# Patient Record
Sex: FEMALE | Race: WHITE | NOT HISPANIC OR LATINO
[De-identification: names, ages, dates, MRNs, and addresses within clinical notes are randomized per-mention and may not be internally consistent; named-entity substitution may affect disease eponyms.]

---

## 2017-01-09 ENCOUNTER — APPOINTMENT (OUTPATIENT)
Dept: PULMONOLOGY | Facility: CLINIC | Age: 47
End: 2017-01-09

## 2017-01-09 VITALS
RESPIRATION RATE: 18 BRPM | DIASTOLIC BLOOD PRESSURE: 70 MMHG | HEART RATE: 68 BPM | BODY MASS INDEX: 48.49 KG/M2 | HEIGHT: 64 IN | WEIGHT: 284 LBS | SYSTOLIC BLOOD PRESSURE: 120 MMHG | OXYGEN SATURATION: 99 %

## 2017-01-13 RX ORDER — ROSUVASTATIN CALCIUM 10 MG/1
10 TABLET, FILM COATED ORAL
Refills: 0 | Status: ACTIVE | COMMUNITY

## 2017-04-10 ENCOUNTER — APPOINTMENT (OUTPATIENT)
Dept: PULMONOLOGY | Facility: CLINIC | Age: 47
End: 2017-04-10

## 2017-04-21 ENCOUNTER — RECORD ABSTRACTING (OUTPATIENT)
Age: 47
End: 2017-04-21

## 2017-04-24 ENCOUNTER — APPOINTMENT (OUTPATIENT)
Dept: PULMONOLOGY | Facility: CLINIC | Age: 47
End: 2017-04-24

## 2017-04-24 VITALS
DIASTOLIC BLOOD PRESSURE: 60 MMHG | BODY MASS INDEX: 49 KG/M2 | HEIGHT: 64 IN | HEART RATE: 68 BPM | RESPIRATION RATE: 16 BRPM | SYSTOLIC BLOOD PRESSURE: 116 MMHG | OXYGEN SATURATION: 99 % | WEIGHT: 287 LBS

## 2017-07-03 ENCOUNTER — APPOINTMENT (OUTPATIENT)
Dept: PULMONOLOGY | Facility: CLINIC | Age: 47
End: 2017-07-03

## 2017-07-03 VITALS
WEIGHT: 275 LBS | DIASTOLIC BLOOD PRESSURE: 74 MMHG | RESPIRATION RATE: 16 BRPM | SYSTOLIC BLOOD PRESSURE: 98 MMHG | HEIGHT: 64 IN | HEART RATE: 78 BPM | BODY MASS INDEX: 46.95 KG/M2 | OXYGEN SATURATION: 99 %

## 2017-07-05 RX ORDER — AMOXICILLIN AND CLAVULANATE POTASSIUM 875; 125 MG/1; MG/1
875-125 TABLET, COATED ORAL
Qty: 14 | Refills: 0 | Status: COMPLETED | COMMUNITY
Start: 2017-03-03

## 2017-10-02 ENCOUNTER — APPOINTMENT (OUTPATIENT)
Dept: PULMONOLOGY | Facility: CLINIC | Age: 47
End: 2017-10-02

## 2018-02-05 ENCOUNTER — APPOINTMENT (OUTPATIENT)
Dept: PULMONOLOGY | Facility: CLINIC | Age: 48
End: 2018-02-05
Payer: MEDICARE

## 2018-02-05 VITALS
BODY MASS INDEX: 47.46 KG/M2 | HEIGHT: 64 IN | SYSTOLIC BLOOD PRESSURE: 130 MMHG | WEIGHT: 278 LBS | DIASTOLIC BLOOD PRESSURE: 78 MMHG | HEART RATE: 80 BPM | RESPIRATION RATE: 16 BRPM | OXYGEN SATURATION: 96 %

## 2018-02-05 PROCEDURE — 99214 OFFICE O/P EST MOD 30 MIN: CPT

## 2018-09-24 ENCOUNTER — APPOINTMENT (OUTPATIENT)
Dept: PULMONOLOGY | Facility: CLINIC | Age: 48
End: 2018-09-24
Payer: MEDICARE

## 2018-09-24 VITALS
DIASTOLIC BLOOD PRESSURE: 77 MMHG | RESPIRATION RATE: 16 BRPM | BODY MASS INDEX: 47.46 KG/M2 | OXYGEN SATURATION: 97 % | SYSTOLIC BLOOD PRESSURE: 117 MMHG | HEIGHT: 64 IN | WEIGHT: 278 LBS | HEART RATE: 87 BPM

## 2018-09-24 PROCEDURE — 99214 OFFICE O/P EST MOD 30 MIN: CPT

## 2018-09-24 RX ORDER — AZITHROMYCIN 250 MG/1
250 TABLET, FILM COATED ORAL
Qty: 6 | Refills: 0 | Status: COMPLETED | COMMUNITY
Start: 2018-05-03

## 2018-09-24 RX ORDER — NITROFURANTOIN (MONOHYDRATE/MACROCRYSTALS) 25; 75 MG/1; MG/1
100 CAPSULE ORAL
Qty: 10 | Refills: 0 | Status: COMPLETED | COMMUNITY
Start: 2018-07-26

## 2018-09-24 RX ORDER — CIPROFLOXACIN HYDROCHLORIDE 500 MG/1
500 TABLET, FILM COATED ORAL
Qty: 10 | Refills: 0 | Status: COMPLETED | COMMUNITY
Start: 2018-04-20

## 2018-09-24 RX ORDER — ALPRAZOLAM 0.5 MG/1
0.5 TABLET ORAL
Qty: 60 | Refills: 0 | Status: ACTIVE | COMMUNITY
Start: 2018-05-10

## 2018-09-24 RX ORDER — ESCITALOPRAM OXALATE 20 MG/1
20 TABLET ORAL
Qty: 90 | Refills: 0 | Status: ACTIVE | COMMUNITY
Start: 2018-05-03

## 2018-09-24 RX ORDER — MONTELUKAST 10 MG/1
10 TABLET, FILM COATED ORAL
Qty: 90 | Refills: 0 | Status: COMPLETED | COMMUNITY
Start: 2018-02-05 | End: 2018-09-24

## 2019-03-18 ENCOUNTER — APPOINTMENT (OUTPATIENT)
Dept: PULMONOLOGY | Facility: CLINIC | Age: 49
End: 2019-03-18
Payer: MEDICARE

## 2019-03-18 VITALS — TEMPERATURE: 102.2 F

## 2019-03-18 VITALS
WEIGHT: 284 LBS | DIASTOLIC BLOOD PRESSURE: 79 MMHG | RESPIRATION RATE: 24 BRPM | HEIGHT: 64 IN | BODY MASS INDEX: 48.49 KG/M2 | HEART RATE: 107 BPM | SYSTOLIC BLOOD PRESSURE: 137 MMHG | OXYGEN SATURATION: 92 %

## 2019-03-18 PROCEDURE — 99214 OFFICE O/P EST MOD 30 MIN: CPT

## 2019-03-18 RX ORDER — ALBUTEROL SULFATE 90 UG/1
108 (90 BASE) AEROSOL, METERED RESPIRATORY (INHALATION)
Qty: 1 | Refills: 1 | Status: ACTIVE | COMMUNITY
Start: 2019-03-18 | End: 1900-01-01

## 2019-03-20 NOTE — PHYSICAL EXAM
[Well Groomed] : well groomed [No Deformities] : no deformities [General Appearance - In No Acute Distress] : no acute distress [Normal Conjunctiva] : the conjunctiva exhibited no abnormalities [Neck Appearance] : the appearance of the neck was normal [Eyelids - No Xanthelasma] : the eyelids demonstrated no xanthelasmas [Neck Cervical Mass (___cm)] : no neck mass was observed [Jugular Venous Distention Increased] : there was no jugular-venous distention [Thyroid Nodule] : there were no palpable thyroid nodules [Thyroid Diffuse Enlargement] : the thyroid was not enlarged [FreeTextEntry1] : Enlarged tonsils bilaterally without exudates [Heart Rate And Rhythm] : heart rate and rhythm were normal [Heart Sounds] : normal S1 and S2 [Murmurs] : no murmurs present [Exaggerated Use Of Accessory Muscles For Inspiration] : no accessory muscle use [Respiration, Rhythm And Depth] : normal respiratory rhythm and effort [Abdomen Soft] : soft [Scattered Wheezes] : scattered wheezing [Abdomen Tenderness] : non-tender [Abdomen Mass (___ Cm)] : no abdominal mass palpated [Cyanosis, Localized] : no localized cyanosis [Nail Clubbing] : no clubbing of the fingernails [Petechial Hemorrhages (___cm)] : no petechial hemorrhages [Skin Color & Pigmentation] : normal skin color and pigmentation [] : no rash [No Venous Stasis] : no venous stasis [Skin Lesions] : no skin lesions [No Skin Ulcers] : no skin ulcer [No Xanthoma] : no  xanthoma was observed [No Focal Deficits] : no focal deficits [Oriented To Time, Place, And Person] : oriented to person, place, and time [Affect] : the affect was normal [Impaired Insight] : insight and judgment were intact

## 2019-03-20 NOTE — REVIEW OF SYSTEMS
[Fatigue] : no fatigue [Cough] : cough [Sputum] : sputum  [Dyspnea] : dyspnea [Chest Tightness] : no chest tightness [Wheezing] : no wheezing [As Noted in HPI] : as noted in HPI [Negative] : Sleep Disorder

## 2019-03-20 NOTE — HISTORY OF PRESENT ILLNESS
[FreeTextEntry1] : 49 yo female with hx of OAD presents for follow up. The patient complains of PRN cough with POLLOCK. She uses singualir daily with increase in albuterol MDI use. She complains of one day history of fever associated with wheezing. She denies chest pain, night sweats or hemoptysis.\par The patient has stopped use of CPAP because of neck pain.

## 2019-03-20 NOTE — DISCUSSION/SUMMARY
[FreeTextEntry1] : 47 yo female with mild exacerbation of OAD. Medrol pack was prescribed. She is to continue singulair daily with PRN albuterol MDI. Treatment adjustment will depend on symptomatic needs. I reviewed the images of her recent chest CT on line and discussed the findings with the patient. The ground glass changes are non specific and likely inflammatory in nature. A repeat study will be performed in the future.Need for CPAP use was stressed but the patient complains of significant discomfort with CPAP.Diet and weight loss was stressed. She is to follow up with her PMD as before.

## 2019-06-17 ENCOUNTER — APPOINTMENT (OUTPATIENT)
Dept: PULMONOLOGY | Facility: CLINIC | Age: 49
End: 2019-06-17
Payer: MEDICARE

## 2019-06-17 VITALS
HEIGHT: 62.5 IN | HEART RATE: 83 BPM | DIASTOLIC BLOOD PRESSURE: 79 MMHG | SYSTOLIC BLOOD PRESSURE: 127 MMHG | RESPIRATION RATE: 20 BRPM | OXYGEN SATURATION: 96 % | WEIGHT: 274 LBS | BODY MASS INDEX: 49.16 KG/M2

## 2019-06-17 PROCEDURE — 94727 GAS DIL/WSHOT DETER LNG VOL: CPT

## 2019-06-17 PROCEDURE — 94060 EVALUATION OF WHEEZING: CPT

## 2019-06-17 PROCEDURE — 94729 DIFFUSING CAPACITY: CPT

## 2019-06-17 PROCEDURE — 99214 OFFICE O/P EST MOD 30 MIN: CPT | Mod: 25

## 2019-06-17 NOTE — PHYSICAL EXAM
[Well Groomed] : well groomed [No Deformities] : no deformities [General Appearance - In No Acute Distress] : no acute distress [Normal Conjunctiva] : the conjunctiva exhibited no abnormalities [Eyelids - No Xanthelasma] : the eyelids demonstrated no xanthelasmas [Neck Appearance] : the appearance of the neck was normal [Neck Cervical Mass (___cm)] : no neck mass was observed [Jugular Venous Distention Increased] : there was no jugular-venous distention [Thyroid Diffuse Enlargement] : the thyroid was not enlarged [Thyroid Nodule] : there were no palpable thyroid nodules [FreeTextEntry1] : Enlarged tonsils bilaterally without exudates [Heart Rate And Rhythm] : heart rate and rhythm were normal [Heart Sounds] : normal S1 and S2 [Murmurs] : no murmurs present [Respiration, Rhythm And Depth] : normal respiratory rhythm and effort [Exaggerated Use Of Accessory Muscles For Inspiration] : no accessory muscle use [Auscultation Breath Sounds / Voice Sounds] : lungs were clear to auscultation bilaterally [Abdomen Tenderness] : non-tender [Abdomen Soft] : soft [Abdomen Mass (___ Cm)] : no abdominal mass palpated [Nail Clubbing] : no clubbing of the fingernails [Cyanosis, Localized] : no localized cyanosis [Petechial Hemorrhages (___cm)] : no petechial hemorrhages [Skin Color & Pigmentation] : normal skin color and pigmentation [] : no rash [Skin Lesions] : no skin lesions [No Venous Stasis] : no venous stasis [No Skin Ulcers] : no skin ulcer [No Xanthoma] : no  xanthoma was observed [No Focal Deficits] : no focal deficits [Oriented To Time, Place, And Person] : oriented to person, place, and time [Impaired Insight] : insight and judgment were intact [Affect] : the affect was normal

## 2019-06-17 NOTE — HISTORY OF PRESENT ILLNESS
[FreeTextEntry1] : 49 yo female with hx of RODGER and OAD presents for follow up. The patient is compliant with CPAP use but is having problem with ears and throat after use. She was recently evaluated by ENT, without significant abnormalities noted. Her machine is "giving her problems".The patient complains of PRN POLLOCK with cough. She uses singulair daily with PRN albuterol MDI.

## 2019-06-17 NOTE — DISCUSSION/SUMMARY
[FreeTextEntry1] : 47 yo female with mild OAD at baseline. She is to continue montelukast daily with PRN albuterol MDI. Treatment adjustment will depend on symptomatic needs. I again discussed the results of her recent chest CT results. Repeat study will be performed in the future. Given the problems she is having with her CPAP machine, a new machine will be ordered.\par She is to follow up with her PMD as before.

## 2020-01-06 ENCOUNTER — APPOINTMENT (OUTPATIENT)
Dept: PULMONOLOGY | Facility: CLINIC | Age: 50
End: 2020-01-06
Payer: MEDICARE

## 2020-01-06 VITALS
HEIGHT: 63 IN | DIASTOLIC BLOOD PRESSURE: 70 MMHG | RESPIRATION RATE: 16 BRPM | OXYGEN SATURATION: 97 % | WEIGHT: 288 LBS | SYSTOLIC BLOOD PRESSURE: 100 MMHG | BODY MASS INDEX: 51.03 KG/M2 | HEART RATE: 74 BPM

## 2020-01-06 DIAGNOSIS — R91.1 SOLITARY PULMONARY NODULE: ICD-10-CM

## 2020-01-06 PROCEDURE — 94727 GAS DIL/WSHOT DETER LNG VOL: CPT

## 2020-01-06 PROCEDURE — 94060 EVALUATION OF WHEEZING: CPT

## 2020-01-06 PROCEDURE — 94729 DIFFUSING CAPACITY: CPT

## 2020-01-06 PROCEDURE — 99214 OFFICE O/P EST MOD 30 MIN: CPT | Mod: 25

## 2020-01-08 PROBLEM — R91.1 PULMONARY NODULE: Status: ACTIVE | Noted: 2018-09-24

## 2020-01-08 NOTE — REVIEW OF SYSTEMS
[Myalgias] : myalgias [Arthralgias] : arthralgias [As Noted in HPI] : as noted in HPI [Negative] : Sleep Disorder [Fatigue] : no fatigue [Cough] : no cough [Sputum] : not coughing up ~M sputum [Dyspnea] : no dyspnea [Chest Tightness] : no chest tightness [Wheezing] : no wheezing

## 2020-01-08 NOTE — DISCUSSION/SUMMARY
[FreeTextEntry1] : 50 yo female with stable mild OAD. She is to use montelukast daily with PRN albuterol MDI. Treatment adjustment will depend on symptomatic needs. PFT will be repeated in the future, to follow up diffusion and lung volumes given rheum problem and treatment. A repeat chest CT will be performed in the near future. Compliance with CPAP use was stressed. She is to follow up with her PMD as before.

## 2020-01-08 NOTE — PHYSICAL EXAM
[Well Groomed] : well groomed [No Deformities] : no deformities [General Appearance - In No Acute Distress] : no acute distress [Normal Conjunctiva] : the conjunctiva exhibited no abnormalities [Eyelids - No Xanthelasma] : the eyelids demonstrated no xanthelasmas [Neck Appearance] : the appearance of the neck was normal [Neck Cervical Mass (___cm)] : no neck mass was observed [Jugular Venous Distention Increased] : there was no jugular-venous distention [Thyroid Diffuse Enlargement] : the thyroid was not enlarged [Thyroid Nodule] : there were no palpable thyroid nodules [Heart Rate And Rhythm] : heart rate and rhythm were normal [Heart Sounds] : normal S1 and S2 [Murmurs] : no murmurs present [Respiration, Rhythm And Depth] : normal respiratory rhythm and effort [Exaggerated Use Of Accessory Muscles For Inspiration] : no accessory muscle use [Auscultation Breath Sounds / Voice Sounds] : lungs were clear to auscultation bilaterally [Abdomen Soft] : soft [Abdomen Tenderness] : non-tender [Abdomen Mass (___ Cm)] : no abdominal mass palpated [Nail Clubbing] : no clubbing of the fingernails [Cyanosis, Localized] : no localized cyanosis [Petechial Hemorrhages (___cm)] : no petechial hemorrhages [Skin Color & Pigmentation] : normal skin color and pigmentation [] : no rash [No Venous Stasis] : no venous stasis [Skin Lesions] : no skin lesions [No Skin Ulcers] : no skin ulcer [No Xanthoma] : no  xanthoma was observed [No Focal Deficits] : no focal deficits [Oriented To Time, Place, And Person] : oriented to person, place, and time [Impaired Insight] : insight and judgment were intact [Affect] : the affect was normal [FreeTextEntry1] : Enlarged tonsils bilaterally without exudates

## 2020-01-08 NOTE — CONSULT LETTER
[Dear  ___] : Dear  [unfilled], [Courtesy Letter:] : I had the pleasure of seeing your patient, [unfilled], in my office today. [Please see my note below.] : Please see my note below. [Consult Closing:] : Thank you very much for allowing me to participate in the care of this patient.  If you have any questions, please do not hesitate to contact me. [Sincerely,] : Sincerely, [DrBryant  ___] : Dr. ZAVALA

## 2020-01-08 NOTE — HISTORY OF PRESENT ILLNESS
[FreeTextEntry1] : 48 yo female with hx of OAD and RODGER presents for follow up. The patient feels "good", compliant with CPAP use with her new machine. She uses montelukast daily with PRN albuterol MDI. Since her last visit, she was started on plaquenil for RA.

## 2020-06-29 ENCOUNTER — APPOINTMENT (OUTPATIENT)
Dept: PULMONOLOGY | Facility: CLINIC | Age: 50
End: 2020-06-29
Payer: MEDICARE

## 2020-06-29 VITALS
RESPIRATION RATE: 16 BRPM | DIASTOLIC BLOOD PRESSURE: 70 MMHG | HEART RATE: 86 BPM | WEIGHT: 293 LBS | SYSTOLIC BLOOD PRESSURE: 100 MMHG | HEIGHT: 63 IN | BODY MASS INDEX: 51.91 KG/M2 | OXYGEN SATURATION: 97 %

## 2020-06-29 PROCEDURE — 99214 OFFICE O/P EST MOD 30 MIN: CPT

## 2020-07-03 NOTE — DISCUSSION/SUMMARY
[FreeTextEntry1] : 51 yo female with stable pulmonary exam. She is to continue montelukast daily with PRN albuterol MDI. Treatment adjustment will depend on symptomatic needs.PFT will be repeated in the future given her history of RA and treatment.Continued compliance with CPAP use stressed. I also discussed the latest chest CT findings after reviewing the images on line. At present there is no need for follow up study . She is to follow up with her PMD as before.

## 2020-07-03 NOTE — PHYSICAL EXAM
[No Acute Distress] : no acute distress [Normal Oropharynx] : normal oropharynx [Normal Appearance] : normal appearance [No Neck Mass] : no neck mass [Normal Rate/Rhythm] : normal rate/rhythm [Normal S1, S2] : normal s1, s2 [No Murmurs] : no murmurs [No Resp Distress] : no resp distress [Clear to Auscultation Bilaterally] : clear to auscultation bilaterally [Benign] : benign [No Abnormalities] : no abnormalities [Normal Gait] : normal gait [No Clubbing] : no clubbing [No Cyanosis] : no cyanosis [No Edema] : no edema [FROM] : FROM [Normal Color/ Pigmentation] : normal color/ pigmentation [No Focal Deficits] : no focal deficits [Oriented x3] : oriented x3 [Normal Affect] : normal affect

## 2020-07-03 NOTE — HISTORY OF PRESENT ILLNESS
[TextBox_4] : 48 yo female with hx of OAD and RODGER presents for follow up. The patient complains of PRN productive cough without fever, chest pain or hemoptysis. She uses montelukast daily with PRN albuterol. She is compliant with CPAP use.

## 2020-07-03 NOTE — CONSULT LETTER
[Dear  ___] : Dear  [unfilled], [Courtesy Letter:] : I had the pleasure of seeing your patient, [unfilled], in my office today. [Please see my note below.] : Please see my note below. [Sincerely,] : Sincerely, [Consult Closing:] : Thank you very much for allowing me to participate in the care of this patient.  If you have any questions, please do not hesitate to contact me. [DrBryant  ___] : Dr. ZAVALA

## 2020-12-28 ENCOUNTER — APPOINTMENT (OUTPATIENT)
Dept: PULMONOLOGY | Facility: CLINIC | Age: 50
End: 2020-12-28
Payer: MEDICARE

## 2020-12-28 VITALS
RESPIRATION RATE: 16 BRPM | SYSTOLIC BLOOD PRESSURE: 120 MMHG | DIASTOLIC BLOOD PRESSURE: 80 MMHG | OXYGEN SATURATION: 97 % | TEMPERATURE: 96.2 F | HEART RATE: 95 BPM

## 2020-12-28 PROCEDURE — 94727 GAS DIL/WSHOT DETER LNG VOL: CPT

## 2020-12-28 PROCEDURE — 94729 DIFFUSING CAPACITY: CPT

## 2020-12-28 PROCEDURE — 99214 OFFICE O/P EST MOD 30 MIN: CPT | Mod: 25

## 2020-12-28 PROCEDURE — 94060 EVALUATION OF WHEEZING: CPT

## 2020-12-28 RX ORDER — FLUOROMETHOLONE ACETATE 1 MG/ML
0.1 SUSPENSION/ DROPS OPHTHALMIC
Qty: 5 | Refills: 0 | Status: COMPLETED | COMMUNITY
Start: 2020-07-06

## 2020-12-28 RX ORDER — METHYLPREDNISOLONE 4 MG/1
4 TABLET ORAL
Qty: 1 | Refills: 3 | Status: COMPLETED | COMMUNITY
Start: 2018-02-05 | End: 2020-12-28

## 2020-12-28 RX ORDER — ALBUTEROL SULFATE 90 UG/1
108 (90 BASE) AEROSOL, METERED RESPIRATORY (INHALATION)
Qty: 1 | Refills: 3 | Status: COMPLETED | COMMUNITY
Start: 2017-04-24 | End: 2020-12-28

## 2020-12-28 RX ORDER — BECLOMETHASONE DIPROPIONATE HFA 80 UG/1
80 AEROSOL, METERED RESPIRATORY (INHALATION)
Qty: 1 | Refills: 3 | Status: COMPLETED | COMMUNITY
Start: 2019-03-18 | End: 2020-12-28

## 2020-12-28 RX ORDER — METHYLPREDNISOLONE 4 MG/1
4 TABLET ORAL
Qty: 21 | Refills: 2 | Status: COMPLETED | COMMUNITY
Start: 2019-03-18 | End: 2020-12-28

## 2020-12-28 RX ORDER — MELOXICAM 15 MG/1
15 TABLET ORAL
Qty: 30 | Refills: 0 | Status: COMPLETED | COMMUNITY
Start: 2020-07-30

## 2020-12-28 RX ORDER — HYDROXYCHLOROQUINE SULFATE 200 MG/1
200 TABLET, FILM COATED ORAL
Qty: 60 | Refills: 0 | Status: COMPLETED | COMMUNITY
Start: 2020-07-30

## 2020-12-28 RX ORDER — METHYLPREDNISOLONE 4 MG/1
4 TABLET ORAL
Qty: 1 | Refills: 0 | Status: COMPLETED | COMMUNITY
Start: 2020-08-11 | End: 2020-12-28

## 2020-12-28 RX ORDER — MONTELUKAST 10 MG/1
10 TABLET, FILM COATED ORAL
Qty: 3 | Refills: 3 | Status: COMPLETED | COMMUNITY
Start: 2019-03-18 | End: 2020-12-28

## 2020-12-28 RX ORDER — AZITHROMYCIN 250 MG/1
250 TABLET, FILM COATED ORAL
Qty: 1 | Refills: 0 | Status: COMPLETED | COMMUNITY
Start: 2020-08-11 | End: 2020-12-28

## 2021-01-09 NOTE — HISTORY OF PRESENT ILLNESS
[Never] : never [TextBox_4] : 51 yo female with hx of RODGER and OAD presents for follow up. The patient complains of POLLOCK since summer with PRN nocturnal wheezing. She denies cough, chest pain or hemoptysis. She was covid 19 positive in August, treated at home with zithromax and medrol.She uses montelukast daily with PRN albuterol MDI. She is on GERD treatment and is compliant with CPAP use.  [TextBox_29] : Denies snoring, daytime somnolence, apneic episodes, AM headaches

## 2021-01-09 NOTE — REVIEW OF SYSTEMS
[Recent Wt Gain (___ Lbs)] : ~T recent [unfilled] lb weight gain [Cough] : no cough [Hemoptysis] : no hemoptysis [Sputum] : no sputum [Wheezing] : wheezing [SOB on Exertion] : sob on exertion [GERD] : gerd [Negative] : Endocrine

## 2021-01-09 NOTE — PHYSICAL EXAM
[No Acute Distress] : no acute distress [Normal Oropharynx] : normal oropharynx [Normal Appearance] : normal appearance [No Neck Mass] : no neck mass [Normal Rate/Rhythm] : normal rate/rhythm [Normal S1, S2] : normal s1, s2 [No Murmurs] : no murmurs [No Resp Distress] : no resp distress [Clear to Auscultation Bilaterally] : clear to auscultation bilaterally [No Abnormalities] : no abnormalities [Benign] : benign [Normal Gait] : normal gait [No Clubbing] : no clubbing [No Cyanosis] : no cyanosis [No Edema] : no edema [FROM] : FROM [Normal Color/ Pigmentation] : normal color/ pigmentation [No Focal Deficits] : no focal deficits [Oriented x3] : oriented x3 [Normal Affect] : normal affect [TextBox_2] : Increased BMI

## 2021-01-09 NOTE — DISCUSSION/SUMMARY
[FreeTextEntry1] : 51 yo female with OAD related complaints. She is to restart flovent use with continued use of montelukast and albuterol MDI. PFT will be repeated in the future. Diet, weight loss and GERD treatment discussed. Follow up with rheumatology and her PMD as before.

## 2021-03-29 ENCOUNTER — APPOINTMENT (OUTPATIENT)
Dept: PULMONOLOGY | Facility: CLINIC | Age: 51
End: 2021-03-29
Payer: MEDICARE

## 2021-03-29 VITALS
RESPIRATION RATE: 20 BRPM | WEIGHT: 293 LBS | HEIGHT: 63 IN | TEMPERATURE: 98 F | BODY MASS INDEX: 51.91 KG/M2 | DIASTOLIC BLOOD PRESSURE: 77 MMHG | SYSTOLIC BLOOD PRESSURE: 128 MMHG | HEART RATE: 85 BPM | OXYGEN SATURATION: 95 %

## 2021-03-29 DIAGNOSIS — J45.991 COUGH VARIANT ASTHMA: ICD-10-CM

## 2021-03-29 PROCEDURE — 99213 OFFICE O/P EST LOW 20 MIN: CPT

## 2021-03-29 RX ORDER — LEVOTHYROXINE SODIUM 175 UG/1
175 TABLET ORAL
Refills: 0 | Status: ACTIVE | COMMUNITY

## 2021-03-29 RX ORDER — FLUTICASONE PROPIONATE 110 UG/1
110 AEROSOL, METERED RESPIRATORY (INHALATION)
Qty: 1 | Refills: 3 | Status: ACTIVE | COMMUNITY
Start: 2019-03-18 | End: 1900-01-01

## 2021-04-04 NOTE — HISTORY OF PRESENT ILLNESS
[Never] : never [TextBox_4] : 49 yo female with hx of RODGER and OAD presents for follow up. The patient complains of PRN dry cough with POLLOCK. She denies chest pain or hemoptysis. She uses montelukast daily with PRN albuterol MDI. She has not used ICS, recommended last visit. Her weight continues to increase with bilateral knee pain.She is compliant with CPAP use. [TextBox_29] : Denies snoring, daytime somnolence, apneic episodes, AM headaches

## 2021-04-04 NOTE — REVIEW OF SYSTEMS
[Recent Wt Gain (___ Lbs)] : ~T recent [unfilled] lb weight gain [Cough] : cough [Hemoptysis] : no hemoptysis [Sputum] : no sputum [Wheezing] : no wheezing [SOB on Exertion] : sob on exertion [GERD] : gerd [Arthralgias] : arthralgias [Negative] : Psychiatric

## 2021-04-04 NOTE — DISCUSSION/SUMMARY
[FreeTextEntry1] : 51 yo female with OAD related complaints. She is to restart flovent 110 BID with continued montelukast daily and PRN albuterol MDI. Treatment adjustment will depend on symptomatic needs. PFT will be repeated in the future. Continued compliance with CPAP use stressed. Diet and weight loss discussed. She is to follow up with her PMD as before.

## 2021-06-21 ENCOUNTER — APPOINTMENT (OUTPATIENT)
Dept: PULMONOLOGY | Facility: CLINIC | Age: 51
End: 2021-06-21
Payer: MEDICARE

## 2021-06-21 VITALS
SYSTOLIC BLOOD PRESSURE: 129 MMHG | TEMPERATURE: 96.8 F | OXYGEN SATURATION: 96 % | BODY MASS INDEX: 51.91 KG/M2 | WEIGHT: 293 LBS | HEIGHT: 63 IN | HEART RATE: 83 BPM | DIASTOLIC BLOOD PRESSURE: 78 MMHG | RESPIRATION RATE: 16 BRPM

## 2021-06-21 VITALS
RESPIRATION RATE: 16 BRPM | WEIGHT: 293 LBS | HEIGHT: 63 IN | TEMPERATURE: 96.8 F | SYSTOLIC BLOOD PRESSURE: 129 MMHG | DIASTOLIC BLOOD PRESSURE: 78 MMHG | HEART RATE: 83 BPM | BODY MASS INDEX: 51.91 KG/M2 | OXYGEN SATURATION: 96 %

## 2021-06-21 PROCEDURE — 99213 OFFICE O/P EST LOW 20 MIN: CPT

## 2021-06-21 RX ORDER — MONTELUKAST 10 MG/1
10 TABLET, FILM COATED ORAL
Qty: 90 | Refills: 3 | Status: ACTIVE | COMMUNITY
Start: 2020-08-20 | End: 1900-01-01

## 2021-06-21 RX ORDER — OMEPRAZOLE 40 MG/1
40 CAPSULE, DELAYED RELEASE ORAL
Qty: 90 | Refills: 0 | Status: ACTIVE | COMMUNITY
Start: 2021-04-21

## 2021-06-21 RX ORDER — DICLOFENAC SODIUM 100 MG/1
100 TABLET, FILM COATED, EXTENDED RELEASE ORAL
Qty: 30 | Refills: 0 | Status: ACTIVE | COMMUNITY
Start: 2021-04-06

## 2021-06-21 NOTE — DISCUSSION/SUMMARY
[FreeTextEntry1] : 49 yo female with stable pulmonary exam . She is to continue ICS, montelukast and albuterol MDI. PFT will be repeated in the future. At present there is no need for follow up PFT. Compliance with CPAP use as before. Continued diet and weight loss stressed. She is to follow up with her PMD as before.

## 2021-06-21 NOTE — REVIEW OF SYSTEMS
[Recent Wt Loss (___ Lbs)] : ~T recent [unfilled] lb weight loss [Cough] : cough [Sputum] : sputum [GERD] : gerd [Arthralgias] : arthralgias [Negative] : Endocrine [Recent Wt Gain (___ Lbs)] : ~T no recent weight gain [Hemoptysis] : no hemoptysis [Wheezing] : no wheezing [SOB on Exertion] : no sob on exertion

## 2021-06-21 NOTE — HISTORY OF PRESENT ILLNESS
[Never] : never [TextBox_4] : 51 yo female with hx of OAD and RODGER presents for follow up. The patient feels "OK" complaining of PRN productive cough. She denies SOB, chest pain or hemoptysis. She uses montelukast daily, flovent BID with PRN albuterol MDI. She is dieting having lost eight pounds. Recently she was evaluated by another rheumatologist, told she does not have RA! She is compliant with CPAP use and denies sleep related complaints. [TextBox_29] : Denies snoring, daytime somnolence, apneic episodes, AM headaches

## 2021-08-17 ENCOUNTER — NON-APPOINTMENT (OUTPATIENT)
Age: 51
End: 2021-08-17

## 2021-12-13 ENCOUNTER — APPOINTMENT (OUTPATIENT)
Dept: PULMONOLOGY | Facility: CLINIC | Age: 51
End: 2021-12-13
Payer: MEDICARE

## 2021-12-13 VITALS
WEIGHT: 290 LBS | OXYGEN SATURATION: 98 % | TEMPERATURE: 98 F | RESPIRATION RATE: 16 BRPM | HEART RATE: 86 BPM | BODY MASS INDEX: 51.38 KG/M2 | DIASTOLIC BLOOD PRESSURE: 70 MMHG | HEIGHT: 63 IN | SYSTOLIC BLOOD PRESSURE: 100 MMHG

## 2021-12-13 PROCEDURE — 99213 OFFICE O/P EST LOW 20 MIN: CPT

## 2022-01-02 NOTE — REVIEW OF SYSTEMS
[Recent Wt Gain (___ Lbs)] : ~T no recent weight gain [Recent Wt Loss (___ Lbs)] : ~T recent [unfilled] lb weight loss [Cough] : no cough [Hemoptysis] : no hemoptysis [Sputum] : no sputum [Wheezing] : no wheezing [SOB on Exertion] : no sob on exertion [GERD] : gerd [Arthralgias] : arthralgias [Negative] : Endocrine

## 2022-01-02 NOTE — HISTORY OF PRESENT ILLNESS
[Never] : never [TextBox_4] : 50 yo female with hx of RODGER and OAD presents for follow up. The patient is "OK" on daily montelukast without albuterol or ICS use. She has not used CPAP for six months due to recall. . She has sleep related complaints but have improved with 25 pound weight loss. She is not   covid 19 vaccinated but still has antibodies. [Awakes Unrefreshed] : awakes unrefreshed [Daytime Somnolence] : daytime somnolence [Fatigue] : fatigue [Snoring] : snoring

## 2022-01-02 NOTE — DISCUSSION/SUMMARY
[FreeTextEntry1] : 50 yo female with hx of OAD at baseline on montelukast and albuterol MDI. Treatment adjustment will depend on symptomatic needs. The benefits versus risks of recalled CPAP use were again discussed. Continued weight loss . She is to follow up with her PMD as before.

## 2022-06-13 RX ORDER — ALBUTEROL SULFATE 90 UG/1
108 (90 BASE) INHALANT RESPIRATORY (INHALATION)
Qty: 1 | Refills: 3 | Status: ACTIVE | COMMUNITY
Start: 2022-06-13 | End: 1900-01-01

## 2022-06-13 RX ORDER — FLUTICASONE PROPIONATE 110 UG/1
110 AEROSOL, METERED RESPIRATORY (INHALATION)
Qty: 1 | Refills: 3 | Status: ACTIVE | COMMUNITY
Start: 2022-06-13 | End: 1900-01-01

## 2022-06-20 ENCOUNTER — APPOINTMENT (OUTPATIENT)
Dept: PULMONOLOGY | Facility: CLINIC | Age: 52
End: 2022-06-20

## 2022-06-20 VITALS
TEMPERATURE: 98 F | HEART RATE: 90 BPM | WEIGHT: 293 LBS | OXYGEN SATURATION: 97 % | RESPIRATION RATE: 16 BRPM | SYSTOLIC BLOOD PRESSURE: 129 MMHG | BODY MASS INDEX: 51.91 KG/M2 | HEIGHT: 63 IN | DIASTOLIC BLOOD PRESSURE: 85 MMHG

## 2022-06-20 PROCEDURE — 99214 OFFICE O/P EST MOD 30 MIN: CPT

## 2022-06-30 NOTE — HISTORY OF PRESENT ILLNESS
[Never] : never [TextBox_4] : 50 yo female with hx of OAD and RODGER presents for follow up. The patient complains of PRN SOB and wheezing with dry cough without fever, chest pain or hemoptysis. She uses montelukast daily with PRN albuterol and flovent. She has not used CPAP for nearly one year, awaiting replacement of recalled machine. [Awakes Unrefreshed] : awakes unrefreshed [Daytime Somnolence] : denies daytime somnolence [Fatigue] : fatigue [Snoring] : snoring

## 2022-06-30 NOTE — REVIEW OF SYSTEMS
[Fatigue] : fatigue [Recent Wt Gain (___ Lbs)] : ~T no recent weight gain [Recent Wt Loss (___ Lbs)] : ~T no recent weight loss [Cough] : cough [Hemoptysis] : no hemoptysis [Sputum] : no sputum [Wheezing] : no wheezing [SOB on Exertion] : no sob on exertion [GERD] : gerd [Arthralgias] : arthralgias [Negative] : Endocrine

## 2022-06-30 NOTE — DISCUSSION/SUMMARY
[FreeTextEntry1] : 52 yo female with hx of RODGER and OAD at baseline. She is to continue montelukast and albuterol as before. Treatment adjustment will depend on symptomatic needs. PFT will be repeated in the future.Compliance with CPAP use stressed when she receives the new machine. Diet and weight loss stressed.

## 2022-08-17 ENCOUNTER — APPOINTMENT (OUTPATIENT)
Dept: PULMONOLOGY | Facility: CLINIC | Age: 52
End: 2022-08-17

## 2022-08-17 VITALS
OXYGEN SATURATION: 98 % | SYSTOLIC BLOOD PRESSURE: 129 MMHG | HEIGHT: 63 IN | TEMPERATURE: 97.9 F | HEART RATE: 98 BPM | DIASTOLIC BLOOD PRESSURE: 85 MMHG | WEIGHT: 283 LBS | BODY MASS INDEX: 50.14 KG/M2

## 2022-08-17 DIAGNOSIS — U09.9 POST COVID-19 CONDITION, UNSPECIFIED: ICD-10-CM

## 2022-08-17 PROCEDURE — 99214 OFFICE O/P EST MOD 30 MIN: CPT

## 2022-08-17 RX ORDER — MONTELUKAST 10 MG/1
10 TABLET, FILM COATED ORAL
Qty: 90 | Refills: 1 | Status: ACTIVE | COMMUNITY
Start: 2022-08-17 | End: 1900-01-01

## 2022-09-04 PROBLEM — U09.9 POST-ACUTE COVID-19 SYNDROME: Status: ACTIVE | Noted: 2022-09-04

## 2022-09-04 NOTE — HISTORY OF PRESENT ILLNESS
[Never] : never [TextBox_4] : 51 yo female with hx of RODGER and OAD presents for follow up. The patient was covid 19 positive for the second time three weeks ago admitted with fever and tachycardia. Presently she complains of dry cough with POLLOCK. She continues to use daily montelukast with PRN albuterol MDI. She has lost 33 pounds. She still has not received new CPAP machine, continuing to complain of sleep related problems. [Awakes Unrefreshed] : awakes unrefreshed [Fatigue] : fatigue [Snoring] : snoring

## 2022-09-04 NOTE — REVIEW OF SYSTEMS
[Fatigue] : fatigue [Recent Wt Gain (___ Lbs)] : ~T no recent weight gain [Recent Wt Loss (___ Lbs)] : ~T recent [unfilled] lb weight loss [Cough] : cough [Hemoptysis] : no hemoptysis [Sputum] : no sputum [Wheezing] : no wheezing [SOB on Exertion] : sob on exertion [GERD] : gerd [Arthralgias] : arthralgias [Negative] : Endocrine

## 2022-09-04 NOTE — DISCUSSION/SUMMARY
[FreeTextEntry1] : 53 yo female with hx of OAD post covid 19 infection, with post infectious/inflammatory complaints. She was given a two week sample of breztri with continued montelukast use with PRN albuterol MDI. Treatment adjustment will depend on symptomatic needs.Her DME company will be contacted for update on new CPAP machine. She is to follow up with her PMD as before.

## 2022-10-17 ENCOUNTER — APPOINTMENT (OUTPATIENT)
Dept: PULMONOLOGY | Facility: CLINIC | Age: 52
End: 2022-10-17

## 2022-10-17 VITALS
TEMPERATURE: 98 F | HEART RATE: 79 BPM | SYSTOLIC BLOOD PRESSURE: 130 MMHG | DIASTOLIC BLOOD PRESSURE: 80 MMHG | OXYGEN SATURATION: 97 % | RESPIRATION RATE: 16 BRPM | WEIGHT: 280 LBS | BODY MASS INDEX: 49.61 KG/M2 | HEIGHT: 63 IN

## 2022-10-17 PROCEDURE — 99214 OFFICE O/P EST MOD 30 MIN: CPT

## 2022-11-27 RX ORDER — ALBUTEROL SULFATE 90 UG/1
108 (90 BASE) AEROSOL, METERED RESPIRATORY (INHALATION)
Qty: 1 | Refills: 1 | Status: ACTIVE | COMMUNITY
Start: 2022-11-27 | End: 1900-01-01

## 2022-12-04 NOTE — DISCUSSION/SUMMARY
[FreeTextEntry1] : 53 yo female with OAD related complaints. She is to restart flovent BID with continued use of montelukast daily and PRN albuterol MDI. Treatment adjustment will depend on symptomatic needs. GERD treatment was also discussed.Continued compliance with CPAP use also stressed.She is to follow up with her PMD as before and for the influenza vaccine.

## 2022-12-04 NOTE — REVIEW OF SYSTEMS
[Cough] : cough [SOB on Exertion] : sob on exertion [GERD] : gerd [Arthralgias] : arthralgias [Negative] : Endocrine [Fatigue] : no fatigue [Recent Wt Gain (___ Lbs)] : ~T no recent weight gain [Recent Wt Loss (___ Lbs)] : ~T no recent weight loss [Hemoptysis] : no hemoptysis [Sputum] : no sputum [Wheezing] : no wheezing

## 2022-12-04 NOTE — HISTORY OF PRESENT ILLNESS
[Never] : never [TextBox_4] : 53 yo female with hx of OAD and RODGER presents for follow up. The patient complains of AM dry cough with PRN SOB. She denies fever, chest pain or hemoptysis. She uses montelukast daily with PRN albuterol MDI and occasional flovent. She is compliant with CPAP use without sleep related complaints. [TextBox_29] : Denies snoring, daytime somnolence, apneic episodes, AM headaches

## 2023-01-09 ENCOUNTER — APPOINTMENT (OUTPATIENT)
Dept: PULMONOLOGY | Facility: CLINIC | Age: 53
End: 2023-01-09
Payer: MEDICARE

## 2023-01-09 VITALS
OXYGEN SATURATION: 96 % | HEART RATE: 95 BPM | TEMPERATURE: 98 F | SYSTOLIC BLOOD PRESSURE: 120 MMHG | BODY MASS INDEX: 48.2 KG/M2 | HEIGHT: 63 IN | RESPIRATION RATE: 16 BRPM | DIASTOLIC BLOOD PRESSURE: 78 MMHG | WEIGHT: 272 LBS

## 2023-01-09 DIAGNOSIS — R91.8 OTHER NONSPECIFIC ABNORMAL FINDING OF LUNG FIELD: ICD-10-CM

## 2023-01-09 DIAGNOSIS — J44.9 CHRONIC OBSTRUCTIVE PULMONARY DISEASE, UNSPECIFIED: ICD-10-CM

## 2023-01-09 DIAGNOSIS — G47.33 OBSTRUCTIVE SLEEP APNEA (ADULT) (PEDIATRIC): ICD-10-CM

## 2023-01-09 DIAGNOSIS — Z99.89 OBSTRUCTIVE SLEEP APNEA (ADULT) (PEDIATRIC): ICD-10-CM

## 2023-01-09 PROCEDURE — 99214 OFFICE O/P EST MOD 30 MIN: CPT

## 2023-03-04 PROBLEM — G47.33 OSA ON CPAP: Status: ACTIVE | Noted: 2018-09-24

## 2023-03-04 PROBLEM — J44.9 OAD (OBSTRUCTIVE AIRWAY DISEASE): Status: ACTIVE | Noted: 2021-01-09

## 2023-03-04 PROBLEM — R91.8 ABNORMAL CT SCAN OF LUNG: Status: ACTIVE | Noted: 2019-03-20

## 2023-03-04 NOTE — DISCUSSION/SUMMARY
[FreeTextEntry1] : 53 yo female with hx of OAD and RODGER at baseline. She is to continue montelukast daily with PRN albuterol MDI as before. Continued compliance with CPAP use stressed. Continued weight loss also encouraged. She is to follow up with her PMD as before.

## 2023-03-04 NOTE — HISTORY OF PRESENT ILLNESS
[Never] : never [TextBox_4] : 53 yo female with hx of OAD and RODGER, presents for follow up. The patient is compliant with CPAP use. She denies sleep related complaints. She complains of PRN POLLOCK with dry cough without fever, chest pain or hemoptysis. She uses montelukast daily with PRN albuterol MDI. She continues to diet and loose weight. [TextBox_29] : Denies snoring, daytime somnolence, apneic episodes, AM headaches

## 2023-03-04 NOTE — REVIEW OF SYSTEMS
[Fatigue] : no fatigue [Recent Wt Gain (___ Lbs)] : ~T no recent weight gain [Recent Wt Loss (___ Lbs)] : ~T no recent weight loss [Cough] : cough [Hemoptysis] : no hemoptysis [Sputum] : no sputum [Wheezing] : no wheezing [SOB on Exertion] : sob on exertion [GERD] : gerd [Arthralgias] : arthralgias [Negative] : Endocrine

## 2023-04-12 NOTE — CONSULT LETTER
[Dear  ___] : Dear  [unfilled], [Courtesy Letter:] : I had the pleasure of seeing your patient, [unfilled], in my office today. [Please see my note below.] : Please see my note below. [Consult Closing:] : Thank you very much for allowing me to participate in the care of this patient.  If you have any questions, please do not hesitate to contact me. [Sincerely,] : Sincerely, negative

## 2023-07-10 ENCOUNTER — APPOINTMENT (OUTPATIENT)
Dept: PULMONOLOGY | Facility: CLINIC | Age: 53
End: 2023-07-10
Payer: MEDICARE

## 2023-07-10 VITALS
DIASTOLIC BLOOD PRESSURE: 78 MMHG | OXYGEN SATURATION: 97 % | BODY MASS INDEX: 48.55 KG/M2 | RESPIRATION RATE: 16 BRPM | SYSTOLIC BLOOD PRESSURE: 108 MMHG | HEIGHT: 63 IN | WEIGHT: 274 LBS | HEART RATE: 78 BPM

## 2023-07-10 PROCEDURE — 94729 DIFFUSING CAPACITY: CPT

## 2023-07-10 PROCEDURE — 94060 EVALUATION OF WHEEZING: CPT

## 2023-07-10 PROCEDURE — 99214 OFFICE O/P EST MOD 30 MIN: CPT | Mod: 25

## 2023-07-10 PROCEDURE — 94727 GAS DIL/WSHOT DETER LNG VOL: CPT

## 2023-07-10 RX ORDER — BENZONATATE 200 MG/1
200 CAPSULE ORAL
Qty: 30 | Refills: 0 | Status: COMPLETED | COMMUNITY
Start: 2023-03-06

## 2023-07-10 RX ORDER — ALBUTEROL SULFATE 90 UG/1
108 (90 BASE) AEROSOL, METERED RESPIRATORY (INHALATION)
Qty: 1 | Refills: 1 | Status: COMPLETED | COMMUNITY
Start: 2020-12-30 | End: 2023-07-10

## 2023-07-10 RX ORDER — NEBIVOLOL 5 MG/1
5 TABLET ORAL
Qty: 30 | Refills: 0 | Status: ACTIVE | COMMUNITY
Start: 2023-04-24

## 2023-07-10 RX ORDER — METOPROLOL SUCCINATE 100 MG/1
100 TABLET, EXTENDED RELEASE ORAL
Qty: 90 | Refills: 0 | Status: COMPLETED | COMMUNITY
Start: 2023-03-31

## 2023-07-10 RX ORDER — ROSUVASTATIN CALCIUM 40 MG/1
40 TABLET, FILM COATED ORAL
Qty: 90 | Refills: 0 | Status: ACTIVE | COMMUNITY
Start: 2023-05-05

## 2023-07-10 RX ORDER — CHLORHEXIDINE GLUCONATE 4 %
325 (65 FE) LIQUID (ML) TOPICAL
Qty: 90 | Refills: 0 | Status: ACTIVE | COMMUNITY
Start: 2023-03-29

## 2023-07-10 RX ORDER — ALBUTEROL SULFATE 90 UG/1
108 (90 BASE) AEROSOL, METERED RESPIRATORY (INHALATION)
Qty: 1 | Refills: 1 | Status: COMPLETED | COMMUNITY
Start: 2022-08-17 | End: 2023-07-10

## 2023-07-10 NOTE — HISTORY OF PRESENT ILLNESS
[Never] : never [TextBox_4] : 52 yo female with hx of OAD and RODGER, presents for follow up. The patient is "OK" on daily montelukast with PRN albuterol and flovent. She complains of PRN POLLOCK without cough or chest pain. She is compliant with CPAP use and denies sleep related complaints. [TextBox_29] : Denies snoring, daytime somnolence, apneic episodes, AM headaches

## 2023-10-23 ENCOUNTER — APPOINTMENT (OUTPATIENT)
Dept: PULMONOLOGY | Facility: CLINIC | Age: 53
End: 2023-10-23
Payer: MEDICARE

## 2023-10-23 VITALS
HEART RATE: 77 BPM | RESPIRATION RATE: 20 BRPM | BODY MASS INDEX: 48.55 KG/M2 | WEIGHT: 274 LBS | HEIGHT: 63 IN | SYSTOLIC BLOOD PRESSURE: 116 MMHG | OXYGEN SATURATION: 99 % | DIASTOLIC BLOOD PRESSURE: 79 MMHG

## 2023-10-23 PROCEDURE — 99214 OFFICE O/P EST MOD 30 MIN: CPT

## 2023-10-23 RX ORDER — PREDNISONE 20 MG/1
20 TABLET ORAL
Qty: 10 | Refills: 0 | Status: ACTIVE | COMMUNITY
Start: 2023-10-23 | End: 1900-01-01

## 2023-11-06 RX ORDER — ALBUTEROL SULFATE 90 UG/1
108 (90 BASE) AEROSOL, METERED RESPIRATORY (INHALATION)
Qty: 1 | Refills: 1 | Status: ACTIVE | COMMUNITY
Start: 2023-11-06 | End: 1900-01-01

## 2023-11-06 RX ORDER — FLUTICASONE PROPIONATE 110 UG/1
110 AEROSOL, METERED RESPIRATORY (INHALATION)
Qty: 1 | Refills: 3 | Status: ACTIVE | COMMUNITY
Start: 2023-11-06 | End: 1900-01-01

## 2024-01-15 ENCOUNTER — APPOINTMENT (OUTPATIENT)
Dept: PULMONOLOGY | Facility: CLINIC | Age: 54
End: 2024-01-15
Payer: MEDICARE

## 2024-01-15 VITALS
OXYGEN SATURATION: 96 % | BODY MASS INDEX: 50.5 KG/M2 | DIASTOLIC BLOOD PRESSURE: 71 MMHG | HEART RATE: 78 BPM | HEIGHT: 63 IN | SYSTOLIC BLOOD PRESSURE: 134 MMHG | WEIGHT: 285 LBS | RESPIRATION RATE: 20 BRPM

## 2024-01-15 PROCEDURE — 99214 OFFICE O/P EST MOD 30 MIN: CPT

## 2024-01-15 NOTE — DISCUSSION/SUMMARY
[FreeTextEntry1] : 53-year-old female with history of OAD with persistent cough.  She was given a 2-week sample of        Trelegy 200 in place of her ICS with continued use of montelukast and albuterol.  I had a very long discussion with her regarding GERD as a trigger despite lack of "burning" because of PPI use.  Proper diet was discussed and stressed.  She will return in 2 weeks for PFTs prior to planned D&C.  She is to continue use of her CPAP as before.  Follow-up with her PMD as before was recommended.

## 2024-01-15 NOTE — CONSULT LETTER
[Dear  ___] : Dear  [unfilled], [Courtesy Letter:] : I had the pleasure of seeing your patient, [unfilled], in my office today. [Please see my note below.] : Please see my note below. [Consult Closing:] : Thank you very much for allowing me to participate in the care of this patient.  If you have any questions, please do not hesitate to contact me. [FreeTextEntry3] : FUDA SPAIN MD  [address], [address] [place] [address]   [Sincerely,] : Sincerely,

## 2024-01-15 NOTE — HISTORY OF PRESENT ILLNESS
[Never] : never [TextBox_4] : 53-year-old female with history of RODGER and OAD presents for follow-up.  Patient presents for preop pulmonary evaluation prior to C February 1.  Patient continues to have productive cough with wheezing since last visit almost 3 months ago.  She denies fever, chills, chest pain, night sweats or hemoptysis.  She has been treated with multiple courses of antibiotics and steroids by her PMD and continues to use montelukast daily Flovent twice daily with albuterol as needed.  She has a history of GERD on PPI claiming to be "okay".  She is compliant with CPAP use and denies sleep-related complaints. [TextBox_29] : Denies snoring, daytime somnolence, apneic episodes, AM headaches

## 2024-01-15 NOTE — REVIEW OF SYSTEMS
[Fatigue] : no fatigue [Recent Wt Gain (___ Lbs)] : ~T no recent weight gain [Recent Wt Loss (___ Lbs)] : ~T no recent weight loss [Cough] : cough [Hemoptysis] : no hemoptysis [Sputum] : no sputum [Dyspnea] : dyspnea [Wheezing] : wheezing [SOB on Exertion] : sob on exertion [GERD] : gerd [Arthralgias] : arthralgias [Negative] : Endocrine

## 2024-01-19 ENCOUNTER — OUTPATIENT (OUTPATIENT)
Dept: OUTPATIENT SERVICES | Facility: HOSPITAL | Age: 54
LOS: 1 days | End: 2024-01-19
Payer: MEDICARE

## 2024-01-19 VITALS
HEIGHT: 62.5 IN | SYSTOLIC BLOOD PRESSURE: 130 MMHG | OXYGEN SATURATION: 97 % | RESPIRATION RATE: 18 BRPM | TEMPERATURE: 98 F | WEIGHT: 285.94 LBS | HEART RATE: 72 BPM | DIASTOLIC BLOOD PRESSURE: 86 MMHG

## 2024-01-19 DIAGNOSIS — Z01.818 ENCOUNTER FOR OTHER PREPROCEDURAL EXAMINATION: ICD-10-CM

## 2024-01-19 DIAGNOSIS — J44.9 CHRONIC OBSTRUCTIVE PULMONARY DISEASE, UNSPECIFIED: ICD-10-CM

## 2024-01-19 DIAGNOSIS — G47.33 OBSTRUCTIVE SLEEP APNEA (ADULT) (PEDIATRIC): ICD-10-CM

## 2024-01-19 DIAGNOSIS — Z98.890 OTHER SPECIFIED POSTPROCEDURAL STATES: Chronic | ICD-10-CM

## 2024-01-19 DIAGNOSIS — E89.0 POSTPROCEDURAL HYPOTHYROIDISM: Chronic | ICD-10-CM

## 2024-01-19 DIAGNOSIS — N93.9 ABNORMAL UTERINE AND VAGINAL BLEEDING, UNSPECIFIED: ICD-10-CM

## 2024-01-19 DIAGNOSIS — E66.01 MORBID (SEVERE) OBESITY DUE TO EXCESS CALORIES: ICD-10-CM

## 2024-01-19 LAB
BLD GP AB SCN SERPL QL: NEGATIVE — SIGNIFICANT CHANGE UP
RH IG SCN BLD-IMP: POSITIVE — SIGNIFICANT CHANGE UP

## 2024-01-19 PROCEDURE — 86850 RBC ANTIBODY SCREEN: CPT

## 2024-01-19 PROCEDURE — 86900 BLOOD TYPING SEROLOGIC ABO: CPT

## 2024-01-19 PROCEDURE — G0463: CPT

## 2024-01-19 PROCEDURE — 86901 BLOOD TYPING SEROLOGIC RH(D): CPT

## 2024-01-19 RX ORDER — CEFOTETAN DISODIUM 1 G
2 VIAL (EA) INJECTION ONCE
Refills: 0 | Status: DISCONTINUED | OUTPATIENT
Start: 2024-01-24 | End: 2024-02-07

## 2024-01-19 NOTE — H&P PST ADULT - NSICDXPASTSURGICALHX_GEN_ALL_CORE_FT
PAST SURGICAL HISTORY:  History of colonoscopy     History of endoscopy     History of excision of pilonidal cyst     History of partial thyroidectomy

## 2024-01-19 NOTE — H&P PST ADULT - OTHER CARE PROVIDERS
Dr Rene (Cards) Dr Abundio Sims (Pulm) Dr Christopher Salcedo (Endo) Dr Darlyn Rene (Cards) [phone]; Dr Abundio Sims (Pulm) [phone]; Dr Christopher Kelly (Endo) [phone]

## 2024-01-19 NOTE — H&P PST ADULT - NSICDXPASTMEDICALHX_GEN_ALL_CORE_FT
PAST MEDICAL HISTORY:  2019 novel coronavirus disease (COVID-19)     Abnormal uterine bleeding due to endometrial polyp     Anxiety and depression     GERD (gastroesophageal reflux disease)     HLD (hyperlipidemia)     HTN (hypertension)     Hypothyroid     OAD (obstructive airway disease)     RODGER on CPAP      PAST MEDICAL HISTORY:  2019 novel coronavirus disease (COVID-19)     Abnormal uterine bleeding due to endometrial polyp     Anxiety and depression     GERD (gastroesophageal reflux disease)     HLD (hyperlipidemia)     HTN (hypertension)     Hypothyroid     OAD (obstructive airway disease)     RODGER on CPAP     Osteoarthritis

## 2024-01-19 NOTE — H&P PST ADULT - ATTENDING COMMENTS
I have personally seen, examined, and participated in the care of this patient. I have reviewed all pertinent clinical information, including history, physical exam, plan, and the Resident 's note and agree except as noted.    Kit Mendez MD

## 2024-01-19 NOTE — H&P PST ADULT - PROBLEM SELECTOR PLAN 1
Followed by Pulm. To follow up on 1/23/24 to complete PFT's for eval ([employer]). To continue inhalers as prescribed.

## 2024-01-19 NOTE — H&P PST ADULT - HISTORY OF PRESENT ILLNESS
53yr old female presents to PST for a scheduled D&C Operative Hysteroscopy lois/ [facility] on 1/24/2024. PMH of RODGER and OAD seen by Pulm for preop evaluation prior to D&C. Patient had productive cough with wheezing from ~3 months ago. She denies fevers, chills, chest pain, night sweats or hemoptysis. She has been treated with multiple courses of antibiotics and steroids by her PMD and continues to use montelukast daily Flovent twice daily with albuterol as needed. She has a history of GERD on PPI claiming to be "okay". She is compliant with CPAP use and denies sleep-related complaints. Is to have PFT's completed.  53yr old female presents to PST for a scheduled D&C Operative Hysteroscopy w/ [facility] on 1/24/2024. PMH of HTN, HLD, OA, Hypothyroid, Depression/Anxiety, GERD, RODGER (CPAP) and OAD (seen by Pulm for preop evaluation prior to D&C but pt needs to follow up w/ PFT's on 1/23/24). Patient had productive cough with wheezing from ~3 months ago, now resolved. She denies fevers, chills, chest pain, night sweats or hemoptysis. PCP seen for eval.

## 2024-01-19 NOTE — H&P PST ADULT - HEART RATE (BEATS/MIN)
Subjective: Osteoarthritis right knee     Patient ID: Triny Birmingham is a 71 y.o. female.    Chief Complaint:    History of Present Illness 71-year female returns with persistent pain discomfort in her right knee.  Patient is failed to respond to anti-inflammatory medication, cortisone injections, bracing and exercise program with modification of activity.  She cannot afford the gel injections it is over $200 per injection.  Again a CT scan showed moderate arthritis in her knee.  Presents today to discuss total joint replacement.  She describes her pain at rest and with activity describing the pain is anywhere is 8 or 9 out of 10.  Currently taking 400 of gabapentin 3 times a day with minimal relief.       Social History     Occupational History   • Occupation: Fork      Employer: [employer]   Tobacco Use   • Smoking status: Current Every Day Smoker     Packs/day: 1.00     Years: 50.00     Pack years: 50.00     Types: Cigarettes   • Smokeless tobacco: Never Used   Substance and Sexual Activity   • Alcohol use: Yes     Comment: social/minimum   • Drug use: No   • Sexual activity: Defer      Review of Systems   Constitutional: Negative for chills, diaphoresis, fever and unexpected weight change.   HENT: Negative for hearing loss, nosebleeds, sore throat and tinnitus.    Eyes: Negative for pain and visual disturbance.   Respiratory: Negative for cough, shortness of breath and wheezing.    Cardiovascular: Negative for chest pain and palpitations.   Gastrointestinal: Negative for abdominal pain, diarrhea, nausea and vomiting.   Endocrine: Negative for cold intolerance, heat intolerance and polydipsia.   Genitourinary: Negative for difficulty urinating, dysuria and hematuria.   Musculoskeletal: Positive for arthralgias. Negative for joint swelling and myalgias.   Skin: Negative for rash and wound.   Allergic/Immunologic: Positive for immunocompromised state. Negative for environmental allergies.    Neurological: Negative for dizziness, syncope and numbness.   Hematological: Does not bruise/bleed easily.   Psychiatric/Behavioral: Negative for dysphoric mood and sleep disturbance. The patient is not nervous/anxious.          Past Medical History:   Diagnosis Date   • Arthritis    • Asthma    • Chest pain    • CHF (congestive heart failure) (CMS/HCC)     EF 25-30% per echo 5/24/2016   • Chronic kidney disease, stage III (moderate) (CMS/HCC) 4/4/2017   • Colon polyp    • COPD (chronic obstructive pulmonary disease) (CMS/HCC)    • Deep venous thrombosis (DVT) of peroneal vein (CMS/HCC) 9/21/2017   • Diabetes mellitus (CMS/HCC)     Type 2   • Fatigue    • GERD (gastroesophageal reflux disease)    • Gout due to renal impairment 11/2/2017   • Health maintenance alteration 9/7/2017   • High blood cholesterol    • High blood pressure    • Hyperlipemia    • Hyperlipidemia    • Hypertension    • Leg pain, left 9/7/2017   • YONY (obstructive sleep apnea) 9/7/2017   • Seasonal allergies    • Sleep apnea    • SOB (shortness of breath) on exertion    • TIA (transient ischemic attack) 6/27/2019   • Tobacco use    • Weight loss, unintentional 9/7/2017     Past Surgical History:   Procedure Laterality Date   • APPENDECTOMY     • BACK SURGERY     • BLADDER SURGERY     • CARDIAC CATHETERIZATION N/A 5/24/2016    Procedure: Coronary angiography;  Surgeon: Tutu Spain MD;  Location: [facility] INVASIVE LOCATION;  Service:    • CARDIAC CATHETERIZATION N/A 5/24/2016    Procedure: Peripheral angiography;  Surgeon: Tutu Spain MD;  Location: [facility] CATH INVASIVE LOCATION;  Service:    • CARDIAC CATHETERIZATION N/A 5/24/2016    Procedure: Left Heart Cath;  Surgeon: Tutu Spain MD;  Location: [facility] CATH INVASIVE LOCATION;  Service:    • CARDIAC CATHETERIZATION N/A 5/24/2016    Procedure: Left ventriculography;  Surgeon: Tutu Spain MD;  Location: [facility] CATH INVASIVE LOCATION;  Service:    •  CARDIAC ELECTROPHYSIOLOGY PROCEDURE N/A 9/1/2017    Procedure: ICD DC new   medtronic;  Surgeon: Hema Dumont MD;  Location:  [facility] CATH INVASIVE LOCATION;  Service:    • COLONOSCOPY N/A 5/16/2016    Procedure: COLONOSCOPY;  Surgeon: Margi Lamar MD;  Location:  [facility] OR;  Service:    • ENDOSCOPY N/A 5/16/2016    Procedure: ESOPHAGOGASTRODUODENOSCOPY;  Surgeon: Margi Lamar MD;  Location:  [facility] OR;  Service:    • NECK SURGERY     • TUBAL ABDOMINAL LIGATION       Family History   Problem Relation Age of Onset   • Diabetes Mother    • Heart disease Mother    • Hypertension Mother    • Arthritis Mother    • Asthma Mother    • Cancer Other    • Hypertension Other    • COPD Maternal Aunt    • Cancer Maternal Aunt    • Liver cancer Father    • Heart disease Father    • Hypertension Father    • Heart disease Brother    • Hypertension Brother    • Breast cancer Neg Hx          Objective:  There were no vitals filed for this visit.  There were no vitals filed for this visit.  There is no height or weight on file to calculate BMI.        Ortho Exam   Reviewed previous x-rays which show some mild arthrosis laterally.  No acute changes noted.  She is alert and oriented x3.  Again she has 0 to 125 degrees of motion with mild to moderate patellofemoral crepitus and mild pain with patellar compression but no instability.  Quad function 5/5.  There is bilateral joint line tenderness with negative Mike's.  No instability at 0 90 degrees nor is or any instability at 0 30 degrees.  Calf is nontender.  Good distal pulses no motor or sensory deficit.    Assessment:        1. Primary osteoarthritis of right knee           Plan: Spent over 15 minutes with the patient and her daughter discussing treatment options going forward.  She is having more pain that she is well live with and wants to discuss total joint replacement.  I gave her literature to review and had a model in the room showing a joint replacement.  We  discussed the risk of surgery and the patient particularly she is diabetic and smokes.  Both of those increased risk of infection which is her primary concern and if it does get infected despite preoperative precautions getting multiple operations including implant removal to eradicate infection.  I stated her BMI must be below 40 which it is A1c must be below 8 that she must stop smoking.  Reviewed other risk which include but not limited to DVT, nerve injury and foot drop or paralysis, vascular injury, periprosthetic fracture requiring further surgery, the need for revision surgery and the fact that 10 to 15% can still have pain discomfort despite a well implanted total knee with the use of intraoperative navigational system that I use.  Discussed the possibility of infection needing amputation in a diabetic who smokes.  Discussed the rehab which is 3 months to year.  She wants to proceed we will get authorization and clearance from her primary care physician and her cardiologist.            Work Status:    JORGE query complete.    Orders:  No orders of the defined types were placed in this encounter.      Medications:  No orders of the defined types were placed in this encounter.      Followup:  Return in about 4 weeks (around 8/12/2019).          Dictated utilizing Dragon dictation    72

## 2024-01-19 NOTE — H&P PST ADULT - NSICDXPROCEDURE_GEN_ALL_CORE_FT
PROCEDURES:  D&C (dilatation and curettage, scraping of uterus) 19-Jan-2024 14:36:17  Sweetie Yoon

## 2024-01-23 ENCOUNTER — TRANSCRIPTION ENCOUNTER (OUTPATIENT)
Age: 54
End: 2024-01-23

## 2024-01-23 ENCOUNTER — APPOINTMENT (OUTPATIENT)
Dept: PULMONOLOGY | Facility: CLINIC | Age: 54
End: 2024-01-23
Payer: MEDICARE

## 2024-01-23 VITALS
DIASTOLIC BLOOD PRESSURE: 78 MMHG | SYSTOLIC BLOOD PRESSURE: 118 MMHG | BODY MASS INDEX: 49.78 KG/M2 | OXYGEN SATURATION: 95 % | HEART RATE: 89 BPM | TEMPERATURE: 96 F | WEIGHT: 281 LBS

## 2024-01-23 VITALS
SYSTOLIC BLOOD PRESSURE: 118 MMHG | WEIGHT: 281 LBS | HEART RATE: 89 BPM | RESPIRATION RATE: 14 BRPM | BODY MASS INDEX: 49.79 KG/M2 | TEMPERATURE: 96 F | HEIGHT: 63 IN | OXYGEN SATURATION: 95 % | DIASTOLIC BLOOD PRESSURE: 78 MMHG

## 2024-01-23 PROBLEM — U07.1 COVID-19: Chronic | Status: ACTIVE | Noted: 2024-01-19

## 2024-01-23 PROBLEM — E78.5 HYPERLIPIDEMIA, UNSPECIFIED: Chronic | Status: ACTIVE | Noted: 2024-01-19

## 2024-01-23 PROBLEM — E66.01 MORBID (SEVERE) OBESITY DUE TO EXCESS CALORIES: Chronic | Status: ACTIVE | Noted: 2024-01-19

## 2024-01-23 PROBLEM — E03.9 HYPOTHYROIDISM, UNSPECIFIED: Chronic | Status: ACTIVE | Noted: 2024-01-19

## 2024-01-23 PROBLEM — G47.33 OBSTRUCTIVE SLEEP APNEA (ADULT) (PEDIATRIC): Chronic | Status: ACTIVE | Noted: 2024-01-19

## 2024-01-23 PROBLEM — N93.9 ABNORMAL UTERINE AND VAGINAL BLEEDING, UNSPECIFIED: Chronic | Status: ACTIVE | Noted: 2024-01-19

## 2024-01-23 PROBLEM — J44.9 CHRONIC OBSTRUCTIVE PULMONARY DISEASE, UNSPECIFIED: Chronic | Status: ACTIVE | Noted: 2024-01-19

## 2024-01-23 PROBLEM — F41.9 ANXIETY DISORDER, UNSPECIFIED: Chronic | Status: ACTIVE | Noted: 2024-01-19

## 2024-01-23 PROBLEM — I10 ESSENTIAL (PRIMARY) HYPERTENSION: Chronic | Status: ACTIVE | Noted: 2024-01-19

## 2024-01-23 PROBLEM — M19.90 UNSPECIFIED OSTEOARTHRITIS, UNSPECIFIED SITE: Chronic | Status: ACTIVE | Noted: 2024-01-19

## 2024-01-23 PROBLEM — K21.9 GASTRO-ESOPHAGEAL REFLUX DISEASE WITHOUT ESOPHAGITIS: Chronic | Status: ACTIVE | Noted: 2024-01-19

## 2024-01-23 PROCEDURE — 94727 GAS DIL/WSHOT DETER LNG VOL: CPT

## 2024-01-23 PROCEDURE — 94060 EVALUATION OF WHEEZING: CPT

## 2024-01-23 PROCEDURE — 94664 DEMO&/EVAL PT USE INHALER: CPT | Mod: 59

## 2024-01-23 PROCEDURE — 99214 OFFICE O/P EST MOD 30 MIN: CPT | Mod: 25

## 2024-01-23 PROCEDURE — 94729 DIFFUSING CAPACITY: CPT

## 2024-01-23 NOTE — REVIEW OF SYSTEMS
[GERD] : gerd [Arthralgias] : arthralgias [Negative] : Endocrine [Fatigue] : no fatigue [Recent Wt Gain (___ Lbs)] : ~T no recent weight gain [Recent Wt Loss (___ Lbs)] : ~T no recent weight loss [Cough] : no cough [Hemoptysis] : no hemoptysis [Sputum] : no sputum [Dyspnea] : no dyspnea [Wheezing] : no wheezing [SOB on Exertion] : no sob on exertion

## 2024-01-23 NOTE — DISCUSSION/SUMMARY
[FreeTextEntry1] : 53-year-old female with history of RODGER and OAD with stable pulmonary exam prior to planned D&C and hysteroscopy.  She is to continue Trelegy daily with albuterol as needed as well as daily montelukast.  Continued compliance with CPAP use was stressed especially the night of surgery.  I reviewed the PFT results with the patient.  At present there is no pulmonary contraindication for planned anesthesia and surgery.  She is to follow-up with her PMD as before.

## 2024-01-23 NOTE — PROCEDURE
[FreeTextEntry1] : PFT results: Normal spirometry.  Mild decrease in lung volumes with normal diffusion however.

## 2024-01-23 NOTE — HISTORY OF PRESENT ILLNESS
[Never] : never [TextBox_4] : 53-year-old female with history of RODGER and OAD presents for preop pulmonary evaluation prior to D&C and hysteroscopy.  The patient claims that she feels "better" on daily Trelegy without albuterol use.  She continues to use montelukast daily.  She is compliant with CPAP use without sleep-related complaints. [TextBox_29] : Denies snoring, daytime somnolence, apneic episodes, AM headaches

## 2024-01-24 ENCOUNTER — TRANSCRIPTION ENCOUNTER (OUTPATIENT)
Age: 54
End: 2024-01-24

## 2024-01-24 ENCOUNTER — RESULT REVIEW (OUTPATIENT)
Age: 54
End: 2024-01-24

## 2024-01-24 ENCOUNTER — OUTPATIENT (OUTPATIENT)
Dept: INPATIENT UNIT | Facility: HOSPITAL | Age: 54
LOS: 1 days | End: 2024-01-24
Payer: MEDICARE

## 2024-01-24 VITALS
RESPIRATION RATE: 16 BRPM | SYSTOLIC BLOOD PRESSURE: 100 MMHG | TEMPERATURE: 98 F | DIASTOLIC BLOOD PRESSURE: 50 MMHG | OXYGEN SATURATION: 96 % | HEART RATE: 70 BPM

## 2024-01-24 VITALS — WEIGHT: 293 LBS | HEIGHT: 62 IN

## 2024-01-24 DIAGNOSIS — Z01.818 ENCOUNTER FOR OTHER PREPROCEDURAL EXAMINATION: ICD-10-CM

## 2024-01-24 DIAGNOSIS — Z98.890 OTHER SPECIFIED POSTPROCEDURAL STATES: Chronic | ICD-10-CM

## 2024-01-24 DIAGNOSIS — E89.0 POSTPROCEDURAL HYPOTHYROIDISM: Chronic | ICD-10-CM

## 2024-01-24 LAB — HCG UR QL: NEGATIVE — SIGNIFICANT CHANGE UP

## 2024-01-24 PROCEDURE — 88305 TISSUE EXAM BY PATHOLOGIST: CPT | Mod: 26

## 2024-01-24 PROCEDURE — 88305 TISSUE EXAM BY PATHOLOGIST: CPT

## 2024-01-24 PROCEDURE — C9399: CPT

## 2024-01-24 PROCEDURE — 81025 URINE PREGNANCY TEST: CPT

## 2024-01-24 PROCEDURE — C1782: CPT

## 2024-01-24 PROCEDURE — 58558 HYSTEROSCOPY BIOPSY: CPT

## 2024-01-24 DEVICE — AVETA SMOL RESECTING DEVICE 2.9MM: Type: IMPLANTABLE DEVICE | Status: FUNCTIONAL

## 2024-01-24 RX ORDER — NEBIVOLOL HYDROCHLORIDE 5 MG/1
1 TABLET ORAL
Refills: 0 | DISCHARGE

## 2024-01-24 RX ORDER — MONTELUKAST 4 MG/1
1 TABLET, CHEWABLE ORAL
Refills: 0 | DISCHARGE

## 2024-01-24 RX ORDER — SODIUM CHLORIDE 9 MG/ML
3 INJECTION INTRAMUSCULAR; INTRAVENOUS; SUBCUTANEOUS EVERY 8 HOURS
Refills: 0 | Status: DISCONTINUED | OUTPATIENT
Start: 2024-01-24 | End: 2024-01-24

## 2024-01-24 RX ORDER — ALPRAZOLAM 0.25 MG
1 TABLET ORAL
Refills: 0 | DISCHARGE

## 2024-01-24 RX ORDER — SODIUM CHLORIDE 9 MG/ML
1000 INJECTION, SOLUTION INTRAVENOUS
Refills: 0 | Status: DISCONTINUED | OUTPATIENT
Start: 2024-01-24 | End: 2024-02-07

## 2024-01-24 RX ORDER — FOLIC ACID 0.8 MG
1 TABLET ORAL
Refills: 0 | DISCHARGE

## 2024-01-24 RX ORDER — ALBUTEROL 90 UG/1
2 AEROSOL, METERED ORAL
Refills: 0 | DISCHARGE

## 2024-01-24 RX ORDER — HYDROMORPHONE HYDROCHLORIDE 2 MG/ML
0.25 INJECTION INTRAMUSCULAR; INTRAVENOUS; SUBCUTANEOUS
Refills: 0 | Status: DISCONTINUED | OUTPATIENT
Start: 2024-01-24 | End: 2024-01-24

## 2024-01-24 RX ORDER — FLUTICASONE FUROATE, UMECLIDINIUM BROMIDE AND VILANTEROL TRIFENATATE 200; 62.5; 25 UG/1; UG/1; UG/1
1 POWDER RESPIRATORY (INHALATION)
Refills: 0 | DISCHARGE

## 2024-01-24 RX ORDER — LEVOTHYROXINE SODIUM 125 MCG
1 TABLET ORAL
Refills: 0 | DISCHARGE

## 2024-01-24 RX ORDER — LIDOCAINE HCL 20 MG/ML
0.2 VIAL (ML) INJECTION ONCE
Refills: 0 | Status: DISCONTINUED | OUTPATIENT
Start: 2024-01-24 | End: 2024-01-24

## 2024-01-24 RX ORDER — OMEPRAZOLE 10 MG/1
1 CAPSULE, DELAYED RELEASE ORAL
Refills: 0 | DISCHARGE

## 2024-01-24 RX ORDER — APREPITANT 80 MG/1
40 CAPSULE ORAL ONCE
Refills: 0 | Status: COMPLETED | OUTPATIENT
Start: 2024-01-24 | End: 2024-01-24

## 2024-01-24 RX ORDER — ROSUVASTATIN CALCIUM 5 MG/1
1 TABLET ORAL
Refills: 0 | DISCHARGE

## 2024-01-24 RX ORDER — ESCITALOPRAM OXALATE 10 MG/1
1 TABLET, FILM COATED ORAL
Refills: 0 | DISCHARGE

## 2024-01-24 RX ORDER — ONDANSETRON 8 MG/1
4 TABLET, FILM COATED ORAL ONCE
Refills: 0 | Status: DISCONTINUED | OUTPATIENT
Start: 2024-01-24 | End: 2024-01-24

## 2024-01-24 RX ADMIN — APREPITANT 40 MILLIGRAM(S): 80 CAPSULE ORAL at 09:17

## 2024-01-24 NOTE — BRIEF OPERATIVE NOTE - DISPOSITION
EMERGENCY DEPARTMENT HISTORY AND PHYSICAL EXAM    Date: 9/16/2019  Patient Name: Severiano Pat    History of Presenting Illness     Time Seen: 11:22 PM    Chief Complaint   Patient presents with    Arm Pain     \"Left arm feels Heavy\"       History Provided By: Patient and EMS    Additional History (Context):   Severiano Pat is a 64 y.o. female presents emergency room via EMS with complaints of left arm heaviness. Patient notes that this is happened to her multiple times in the last several weeks. Initially last week had an episode where the heaviness lasted about 15 to 20 seconds. Slowly resolved. Today, the left arm heaviness occurred 2 different times each lasting longer. States that she actually had to hold her arm with her right hand. Slowly resolved. She is had some associated numbness and tingling on occasions extending from elbow down into the hand. Denies any issues with her right arm. No complaints of headaches. No history of stroke or TIA. She is had some neck discomfort. Patient also seems to associate a lot of her symptoms stress and anxiety. Patient admits to having issues with chronic pain. Patient does not have a PCP at this time. PCP: None    Current Outpatient Medications   Medication Sig Dispense Refill    carvedilol (COREG CR) 10 mg CR capsule Take 1 Cap by mouth daily (with breakfast). 90 Cap 0    Dexlansoprazole (DEXILANT) 60 mg CpDB Take 1 Cap by mouth daily as needed. 90 Cap 0    levothyroxine (SYNTHROID) 25 mcg tablet Take 1 Tab by mouth Daily (before breakfast). 90 Tab 0    cyclobenzaprine (FLEXERIL) 10 mg tablet Take 1 Tab by mouth nightly. 30 Tab 3    ibuprofen (MOTRIN) 800 mg tablet Take  by mouth.          Past History     Past Medical History:  Past Medical History:   Diagnosis Date    Anxiety     Chronic pain     Depression     Fibromyalgia     Hypothyroidism 1990    Migraines     PTSD (post-traumatic stress disorder)     Tremor, essential Past Surgical History:  Past Surgical History:   Procedure Laterality Date    HX COLONOSCOPY  2005    HX ENDOSCOPY  2012    HX HEENT      HX SHOULDER ARTHROSCOPY         Family History:  Family History   Problem Relation Age of Onset    Cancer Mother     Stroke Father     No Known Problems Sister     No Known Problems Brother     No Known Problems Brother     No Known Problems Brother     No Known Problems Brother        Social History:  Social History     Tobacco Use    Smoking status: Never Smoker    Smokeless tobacco: Never Used   Substance Use Topics    Alcohol use: No     Comment: socially    Drug use: No       Allergies: Allergies   Allergen Reactions    Pcn [Penicillins] Anaphylaxis         Review of Systems   Review of Systems   Constitutional: Negative. Respiratory: Negative. Cardiovascular: Negative. Gastrointestinal: Negative. Musculoskeletal: Positive for neck pain. Chronic musculoskeletal pain   Neurological: Positive for weakness and numbness. Negative for dizziness, light-headedness and headaches. Weakness/heaviness left upper extremity  History of essential tremor       Physical Exam     Vitals:    09/16/19 2243 09/16/19 2245 09/16/19 2300 09/16/19 2315   BP: 151/77 153/77 143/68 143/77   Pulse: 74 74 75 74   Resp: 18 17 14 20   Temp: 98.9 °F (37.2 °C)      SpO2: 100% 97% 96% 95%   Weight: 88 kg (194 lb)      Height: 5' 2\" (1.575 m)        Physical Exam   Constitutional: She is oriented to person, place, and time. She appears well-developed and well-nourished. She is cooperative. She does not appear ill. No distress. HENT:   Head: Normocephalic. Eyes: Pupils are equal, round, and reactive to light. Conjunctivae and EOM are normal.   Neck: Neck supple. Cardiovascular: Normal rate, regular rhythm and normal heart sounds.    Pulmonary/Chest: Effort normal and breath sounds normal.   Musculoskeletal:        Cervical back: She exhibits decreased range of motion. She exhibits no tenderness, no bony tenderness, no swelling, no deformity and no pain. Thoracic back: Normal.        Lumbar back: Normal.   Bilateral upper extremities -no signs of any abnormality to inspection. No evidence of muscle atrophy. Full range of motion bilateral upper extremities with equal strength bilaterally. Neurological: She is alert and oriented to person, place, and time. She displays tremor. She displays no atrophy. No cranial nerve deficit or sensory deficit. She exhibits normal muscle tone. She displays no seizure activity. Coordination and gait normal.   Tremor noted bilateral upper extremities  No facial droop  No slurred speech   Skin: Skin is warm and dry. Psychiatric: She has a normal mood and affect. Nursing note and vitals reviewed. Nursing note and vitals reviewed         Diagnostic Study Results     Labs -     Recent Results (from the past 12 hour(s))   EKG, 12 LEAD, INITIAL    Collection Time: 09/16/19 10:55 PM   Result Value Ref Range    Ventricular Rate 67 BPM    Atrial Rate 67 BPM    P-R Interval 140 ms    QRS Duration 94 ms    Q-T Interval 376 ms    QTC Calculation (Bezet) 397 ms    Calculated P Axis 34 degrees    Calculated R Axis -13 degrees    Calculated T Axis 13 degrees    Diagnosis       Normal sinus rhythm  Normal ECG  No previous ECGs available         Radiologic Studies   Preliminary results of her cervical spine show significant degenerative changes to include arthritic and disc issues. Official reading by radiology is pending. XR SPINE CERV 4 OR 5 V    (Results Pending)     CT Results  (Last 48 hours)    None        CXR Results  (Last 48 hours)    None            Medical Decision Making   I am the first provider for this patient. I reviewed the vital signs, available nursing notes, past medical history, past surgical history, family history and social history. Vital Signs-Reviewed the patient's vital signs.     Records Reviewed: Nursing Notes, Old Medical Records and Previous Radiology Studies    DDX: Neuromuscular weakness left upper extremity?'s degenerative disc disease cervical spine, nerve impingement. Doubt cerebrovascular disease. Patient with a known history of essential tremor. Doubt cardiac. Provider Notes:   64 y.o. female presented by ambulance with complaints of left arm heaviness. Has had similar issues in the last couple weeks. On examination here patient shows absolutely no signs of any neuromuscular dysfunction. No evidence of cerebrovascular disease. Her strength in her upper extremities were equal bilaterally. No noticeable deficits. Long talk with patient ensued regarding her symptoms. Did not feel patient warranted having a CT scan or MRI of her head or neck. Opted to get plain film x-rays on her cervical spine. X-rays showed significant degenerative changes. Patient was informed of these results. Again a long discussion with the patient regarding her issues. I highly recommended that she get into see orthospine to have better evaluation done. Possibly may need an MRI. Patient admitted not to having insurance. Does not have a PCP. Told her that spinal much else I can offer her from emergency room standpoint here tonight. Recommended following up with orthopedics again. Prior to being discharge, patient asked for a refill of her AcipHex for GERD. This was denied. Patient was given a referral to see [facility] clinic. Procedures:  Procedures    ED Course:   Initial assessment performed. The patients presenting problems have been discussed, and they are in agreement with the care plan formulated and outlined with them. I have encouraged them to ask questions as they arise throughout their visit. Diagnosis and Disposition       DISCHARGE NOTE:  Lorrie Mcgowan's  results have been reviewed with her. She has been counseled regarding her diagnosis, treatment, and plan.   She verbally conveys understanding and agreement of the signs, symptoms, diagnosis, treatment and prognosis and additionally agrees to follow up as discussed. She also agrees with the care-plan and conveys that all of her questions have been answered. I have also provided discharge instructions for her that include: educational information regarding their diagnosis and treatment, and list of reasons why they would want to return to the ED prior to their follow-up appointment, should her condition change. She has been provided with education for proper emergency department utilization. CLINICAL IMPRESSION:    1. Left arm pain    2. Left arm weakness    3. Degenerative disc disease, cervical    4. Osteoarthritis of cervical spine, unspecified spinal osteoarthritis complication status    5. Tremor        PLAN:  1. D/C Home  2. Discharge Medication List as of 9/17/2019  1:15 AM        3. Follow-up Information     Follow up With Specialties Details Why Contact Info    Raj Apley, MD Orthopedic Surgery Call in 1 day Call to make a follow-up appointment if symptoms continue or worsen [place] [id number]      [facility] EMERGENCY DEPT Emergency Medicine  If symptoms worsen, As needed [address] [address]  [phone]        ____________________________________     Please note that this dictation was completed with Bunch, the computer voice recognition software. Quite often unanticipated grammatical, syntax, homophones, and other interpretive errors are inadvertently transcribed by the computer software. Please disregard these errors. Please excuse any errors that have escaped final proofreading. PACU

## 2024-01-24 NOTE — ASU PATIENT PROFILE, ADULT - FALL HARM RISK - UNIVERSAL INTERVENTIONS
Bed in lowest position, wheels locked, appropriate side rails in place/Call bell, personal items and telephone in reach/Instruct patient to call for assistance before getting out of bed or chair/Non-slip footwear when patient is out of bed/[place] to call system/Physically safe environment - no spills, clutter or unnecessary equipment/Purposeful Proactive Rounding/Room/bathroom lighting operational, light cord in reach

## 2024-01-24 NOTE — ASU PATIENT PROFILE, ADULT - NS PRO LAST MENSTRUAL
07/2023 Implemented All Universal Safety Interventions:  [place] to call system. Call bell, personal items and telephone within reach. Instruct patient to call for assistance. Room bathroom lighting operational. Non-slip footwear when patient is off stretcher. Physically safe environment: no spills, clutter or unnecessary equipment. Stretcher in lowest position, wheels locked, appropriate side rails in place.

## 2024-01-24 NOTE — ASU DISCHARGE PLAN (ADULT/PEDIATRIC) - CARE PROVIDER_API CALL
Kit Mendez)  Obstetrics and Gynecology  [address]  [place] [address]  Phone: [phone]  Fax: [phone]  Follow Up Time: 2 weeks

## 2024-01-24 NOTE — ASU DISCHARGE PLAN (ADULT/PEDIATRIC) - CALL YOUR DOCTOR IF YOU HAVE ANY OF THE FOLLOWING:
Swelling that gets worse/Pain not relieved by Medications/Fever greater than (need to indicate Fahrenheit or Celsius)/Wound/Surgical Site with redness, or foul smelling discharge or pus/Unable to urinate

## 2024-01-24 NOTE — ASU DISCHARGE PLAN (ADULT/PEDIATRIC) - ACTIVITY LEVEL
No exercise/No heavy lifting/No sports/gym/No weight bearing/Nothing per vagina/No tub baths/No douching/No tampons/No intercourse

## 2024-01-24 NOTE — ASU PATIENT PROFILE, ADULT - MEDICATION HERBAL REMEDIES, PROFILE
no
Orientation to room/Bed in low position, brakes on/Side rails x 2 or 4 up, assess large gaps, such that a patient could get extremity or other body part entrapped, use additional safety procedures/Call light is within reach, educate patient/family on its functionality/Environment clear of unused equipment, furniture's in place, clear of hazards/Remove all unused equipment out of the room/Keep door open at all times unless specified isolation precautions are in use/Keep bed in the lowest position, unless patient is directly attended

## 2024-01-24 NOTE — BRIEF OPERATIVE NOTE - NSICDXBRIEFPROCEDURE_GEN_ALL_CORE_FT
PROCEDURES:  Operative hysteroscopy with fluid management system 24-Jan-2024 10:36:00  Kit Mendez  Dilation and curettage of uterus 24-Jan-2024 10:36:09  Kit Mendez

## 2024-01-24 NOTE — ASU DISCHARGE PLAN (ADULT/PEDIATRIC) - NS MD DC FALL RISK RISK
For information on Fall & Injury Prevention, visit: [email or web address] OR  [email or web address] OR  https://www.cdc.gov/steadi/patient.html

## 2024-01-30 LAB — SURGICAL PATHOLOGY STUDY: SIGNIFICANT CHANGE UP

## 2024-01-30 RX ORDER — FLUTICASONE FUROATE, UMECLIDINIUM BROMIDE AND VILANTEROL TRIFENATATE 200; 62.5; 25 UG/1; UG/1; UG/1
200-62.5-25 POWDER RESPIRATORY (INHALATION)
Qty: 1 | Refills: 3 | Status: ACTIVE | COMMUNITY
Start: 2024-01-30 | End: 1900-01-01

## 2024-01-31 ENCOUNTER — APPOINTMENT (OUTPATIENT)
Dept: PULMONOLOGY | Facility: CLINIC | Age: 54
End: 2024-01-31

## 2024-04-24 NOTE — ASU PATIENT PROFILE, ADULT - AS SC BRADEN MOISTURE
(4) rarely moist Detail Level: Zone Render In Strict Bullet Format?: Yes Continue Regimen: fluocinonide 0.05 % topical solution \\nQuantity: 60.0 ml  Days Supply: 30\\nSig: Apply bid to scalp x1-2 wks on/off prn flares

## 2024-05-20 ENCOUNTER — APPOINTMENT (OUTPATIENT)
Dept: PULMONOLOGY | Facility: CLINIC | Age: 54
End: 2024-05-20
Payer: MEDICARE

## 2024-05-20 VITALS
TEMPERATURE: 98 F | HEIGHT: 63 IN | HEART RATE: 81 BPM | BODY MASS INDEX: 51.21 KG/M2 | DIASTOLIC BLOOD PRESSURE: 82 MMHG | WEIGHT: 289 LBS | OXYGEN SATURATION: 97 % | SYSTOLIC BLOOD PRESSURE: 140 MMHG

## 2024-05-20 PROCEDURE — 99214 OFFICE O/P EST MOD 30 MIN: CPT | Mod: 25

## 2024-05-20 PROCEDURE — 94060 EVALUATION OF WHEEZING: CPT

## 2024-05-20 NOTE — REASON FOR VISIT
[Follow-Up] : a follow-up visit [Sleep Apnea] : sleep apnea [Pre-op Risk Stratification] : pre-op risk stratification [TextBox_44] : OAD

## 2024-05-20 NOTE — CONSULT LETTER
[Dear  ___] : Dear  [unfilled], [Courtesy Letter:] : I had the pleasure of seeing your patient, [unfilled], in my office today. [Please see my note below.] : Please see my note below. [Consult Closing:] : Thank you very much for allowing me to participate in the care of this patient.  If you have any questions, please do not hesitate to contact me. [Sincerely,] : Sincerely, [FreeTextEntry3] : FUAD SPAIN MD  [address], [address] [place] [address]    [DrBryant  ___] : Dr. ZAVALA

## 2024-05-20 NOTE — HISTORY OF PRESENT ILLNESS
[Never] : never [TextBox_4] : 53-year-old female with history of RODGER and OAD presents for preop pulmonary evaluation prior to thyroid resection.  The patient feels "okay" compliant with CPAP use.  She denies sleep-related complaints.  Her breathing has been "okay" on daily Trelegy and montelukast without need to use albuterol MDI. [TextBox_29] : Denies snoring, daytime somnolence, apneic episodes, AM headaches

## 2024-05-20 NOTE — REVIEW OF SYSTEMS
[Fatigue] : no fatigue [Recent Wt Gain (___ Lbs)] : ~T no recent weight gain [Recent Wt Loss (___ Lbs)] : ~T no recent weight loss [Cough] : no cough [Hemoptysis] : no hemoptysis [Sputum] : no sputum [Dyspnea] : no dyspnea [Wheezing] : no wheezing [SOB on Exertion] : no sob on exertion [GERD] : gerd [Arthralgias] : arthralgias [Chronic Pain] : chronic pain [Negative] : Endocrine

## 2024-05-20 NOTE — PHYSICAL EXAM
[No Acute Distress] : no acute distress [Normal Oropharynx] : normal oropharynx [Normal Rate/Rhythm] : normal rate/rhythm [Normal S1, S2] : normal s1, s2 [No Murmurs] : no murmurs [No Resp Distress] : no resp distress [Clear to Auscultation Bilaterally] : clear to auscultation bilaterally [No Abnormalities] : no abnormalities [Benign] : benign [No Clubbing] : no clubbing [No Cyanosis] : no cyanosis [No Edema] : no edema [FROM] : FROM [Normal Color/ Pigmentation] : normal color/ pigmentation [No Focal Deficits] : no focal deficits [Oriented x3] : oriented x3 [Normal Affect] : normal affect [TextBox_2] : Increased BMI [TextBox_44] : Left-sided neck fullness [TextBox_99] : Ambulates with cane

## 2024-05-20 NOTE — DISCUSSION/SUMMARY
[FreeTextEntry1] : 53-year-old female with history of RODGER and OAD with stable preop pulmonary evaluation prior to thyroid surgery.  I reviewed the spirometry with the patient.  She is continue Trelegy montelukast and albuterol as before.  Continue compliance with CPAP use was stressed, especially during the perioperative period.  At present there is no pulmonary contraindication from planned anesthesia and surgery.  She is to follow-up with her surgeon and PMD as before.

## 2024-06-18 ENCOUNTER — APPOINTMENT (OUTPATIENT)
Dept: PULMONOLOGY | Facility: CLINIC | Age: 54
End: 2024-06-18
Payer: MEDICARE

## 2024-06-18 VITALS
OXYGEN SATURATION: 96 % | DIASTOLIC BLOOD PRESSURE: 78 MMHG | RESPIRATION RATE: 16 BRPM | SYSTOLIC BLOOD PRESSURE: 122 MMHG | WEIGHT: 283 LBS | HEIGHT: 63 IN | TEMPERATURE: 96.4 F | BODY MASS INDEX: 50.14 KG/M2 | HEART RATE: 102 BPM

## 2024-06-18 VITALS
BODY MASS INDEX: 50.13 KG/M2 | SYSTOLIC BLOOD PRESSURE: 122 MMHG | HEART RATE: 102 BPM | DIASTOLIC BLOOD PRESSURE: 78 MMHG | TEMPERATURE: 96.4 F | OXYGEN SATURATION: 96 % | WEIGHT: 283 LBS

## 2024-06-18 PROCEDURE — 99214 OFFICE O/P EST MOD 30 MIN: CPT

## 2024-06-18 NOTE — REVIEW OF SYSTEMS
[Fatigue] : no fatigue [Recent Wt Gain (___ Lbs)] : ~T no recent weight gain [Recent Wt Loss (___ Lbs)] : ~T recent [unfilled] lb weight loss [Cough] : no cough [Hemoptysis] : no hemoptysis [Sputum] : no sputum [Dyspnea] : no dyspnea [Wheezing] : no wheezing [SOB on Exertion] : sob on exertion [GERD] : gerd [Arthralgias] : arthralgias [Chronic Pain] : chronic pain [Negative] : Endocrine

## 2024-06-18 NOTE — DISCUSSION/SUMMARY
[FreeTextEntry1] : 53-year-old female with history of OAD, RODGER on CPAP, complaining of POLLOCK post recent thyroidectomy.  The patient ambulated to the office nearly 500 feet, without any significant oxygen desaturation.  I had a very long discussion with the patient and her sister who was present throughout.  Her POLLOCK is likely related to multiple causes including postop micro atelectasis and physical deconditioning.  She is to continue use of Trelegy and montelukast daily with albuterol as needed.  Diet weight loss and exercise were stressed.  Cardiac evaluation is planned for tomorrow with Dr. Rene.  Continued CPAP use as before was strongly recommended.  Patient was reassured that her new Nilesh machine would not pose a problem.  It is evident that the patient requires more nursing home care at home.  She is to follow-up with her PMD as before.

## 2024-06-18 NOTE — PHYSICAL EXAM
[No Acute Distress] : no acute distress [Normal Oropharynx] : normal oropharynx [Normal Rate/Rhythm] : normal rate/rhythm [Normal S1, S2] : normal s1, s2 [No Murmurs] : no murmurs [No Resp Distress] : no resp distress [Clear to Auscultation Bilaterally] : clear to auscultation bilaterally [No Abnormalities] : no abnormalities [Benign] : benign [No Clubbing] : no clubbing [No Cyanosis] : no cyanosis [No Edema] : no edema [FROM] : FROM [Normal Color/ Pigmentation] : normal color/ pigmentation [No Focal Deficits] : no focal deficits [Oriented x3] : oriented x3 [Normal Affect] : normal affect [TextBox_2] : Increased BMI [TextBox_44] : Postop neck scar [TextBox_99] : Ambulates with cane

## 2024-07-23 RX ORDER — FLUTICASONE FUROATE, UMECLIDINIUM BROMIDE AND VILANTEROL TRIFENATATE 200; 62.5; 25 UG/1; UG/1; UG/1
200-62.5-25 POWDER RESPIRATORY (INHALATION)
Qty: 1 | Refills: 3 | Status: ACTIVE | COMMUNITY
Start: 2024-07-23 | End: 1900-01-01

## 2024-09-25 ENCOUNTER — APPOINTMENT (OUTPATIENT)
Dept: PULMONOLOGY | Facility: CLINIC | Age: 54
End: 2024-09-25

## 2024-09-25 VITALS
TEMPERATURE: 97.8 F | HEART RATE: 78 BPM | SYSTOLIC BLOOD PRESSURE: 127 MMHG | BODY MASS INDEX: 51.38 KG/M2 | HEIGHT: 63 IN | DIASTOLIC BLOOD PRESSURE: 74 MMHG | OXYGEN SATURATION: 96 % | RESPIRATION RATE: 18 BRPM | WEIGHT: 290 LBS

## 2024-09-25 PROCEDURE — 99214 OFFICE O/P EST MOD 30 MIN: CPT | Mod: 25

## 2024-09-25 PROCEDURE — 94729 DIFFUSING CAPACITY: CPT

## 2024-09-25 PROCEDURE — 94727 GAS DIL/WSHOT DETER LNG VOL: CPT

## 2024-09-25 PROCEDURE — 94060 EVALUATION OF WHEEZING: CPT

## 2024-09-25 NOTE — HISTORY OF PRESENT ILLNESS
[Never] : never [TextBox_29] : Denies snoring, daytime somnolence, apneic episodes, AM headaches [TextBox_4] : 54-year-old female with history of RODGER on CPAP via full facemask, OAD, post thyroidectomy with transient hypoxemia, presents for follow-up.  Patient continues to complain of POLLOCK without cough, chest pain, hemoptysis, night sweats or weight loss.  Since last visit the patient underwent cardiac evaluation because of her POLLOCK which she states was "okay".  She continues to use Trelegy daily with Ventolin on occasion.  Her TSH level remains high.

## 2024-09-25 NOTE — CONSULT LETTER
[Dear  ___] : Dear  [unfilled], [Courtesy Letter:] : I had the pleasure of seeing your patient, [unfilled], in my office today. [Please see my note below.] : Please see my note below. [Consult Closing:] : Thank you very much for allowing me to participate in the care of this patient.  If you have any questions, please do not hesitate to contact me. [Sincerely,] : Sincerely, [FreeTextEntry3] : FUAD SPAIN MD  [address], [address] [place] [address]

## 2024-09-25 NOTE — REVIEW OF SYSTEMS
[Fatigue] : fatigue [Recent Wt Gain (___ Lbs)] : ~T no recent weight gain [Cough] : no cough [Hemoptysis] : no hemoptysis [Sputum] : no sputum [Dyspnea] : no dyspnea [Wheezing] : no wheezing [SOB on Exertion] : sob on exertion [GERD] : gerd [Arthralgias] : arthralgias [Chronic Pain] : chronic pain [Negative] : Endocrine

## 2024-11-18 RX ORDER — ALBUTEROL SULFATE 90 UG/1
108 (90 BASE) AEROSOL, METERED RESPIRATORY (INHALATION)
Qty: 1 | Refills: 1 | Status: ACTIVE | COMMUNITY
Start: 2024-11-18 | End: 1900-01-01

## 2024-11-18 RX ORDER — FLUTICASONE FUROATE, UMECLIDINIUM BROMIDE AND VILANTEROL TRIFENATATE 200; 62.5; 25 UG/1; UG/1; UG/1
200-62.5-25 POWDER RESPIRATORY (INHALATION)
Qty: 1 | Refills: 3 | Status: ACTIVE | COMMUNITY
Start: 2024-11-18 | End: 1900-01-01

## 2024-11-18 RX ORDER — MONTELUKAST 10 MG/1
10 TABLET, FILM COATED ORAL
Qty: 90 | Refills: 1 | Status: ACTIVE | COMMUNITY
Start: 2024-11-18 | End: 1900-01-01

## 2025-01-27 ENCOUNTER — APPOINTMENT (OUTPATIENT)
Dept: PULMONOLOGY | Facility: CLINIC | Age: 55
End: 2025-01-27
Payer: MEDICARE

## 2025-01-27 VITALS
OXYGEN SATURATION: 97 % | DIASTOLIC BLOOD PRESSURE: 70 MMHG | SYSTOLIC BLOOD PRESSURE: 110 MMHG | BODY MASS INDEX: 51.91 KG/M2 | HEART RATE: 71 BPM | RESPIRATION RATE: 20 BRPM | HEIGHT: 63 IN | WEIGHT: 293 LBS

## 2025-01-27 DIAGNOSIS — R06.09 OTHER FORMS OF DYSPNEA: ICD-10-CM

## 2025-01-27 PROCEDURE — 94618 PULMONARY STRESS TESTING: CPT

## 2025-01-27 PROCEDURE — 99214 OFFICE O/P EST MOD 30 MIN: CPT | Mod: 25

## 2025-01-27 RX ORDER — LEVOTHYROXINE SODIUM 137 UG/1
TABLET ORAL
Refills: 0 | Status: ACTIVE | COMMUNITY

## 2025-01-28 ENCOUNTER — TRANSCRIPTION ENCOUNTER (OUTPATIENT)
Age: 55
End: 2025-01-28

## 2025-02-21 ENCOUNTER — OUTPATIENT (OUTPATIENT)
Dept: OUTPATIENT SERVICES | Facility: HOSPITAL | Age: 55
LOS: 1 days | End: 2025-02-21
Payer: MEDICARE

## 2025-02-21 VITALS
OXYGEN SATURATION: 96 % | HEIGHT: 62 IN | WEIGHT: 293 LBS | SYSTOLIC BLOOD PRESSURE: 94 MMHG | HEART RATE: 78 BPM | RESPIRATION RATE: 16 BRPM | TEMPERATURE: 100 F | DIASTOLIC BLOOD PRESSURE: 55 MMHG

## 2025-02-21 DIAGNOSIS — Z98.890 OTHER SPECIFIED POSTPROCEDURAL STATES: Chronic | ICD-10-CM

## 2025-02-21 DIAGNOSIS — N95.0 POSTMENOPAUSAL BLEEDING: ICD-10-CM

## 2025-02-21 DIAGNOSIS — K21.9 GASTRO-ESOPHAGEAL REFLUX DISEASE WITHOUT ESOPHAGITIS: ICD-10-CM

## 2025-02-21 DIAGNOSIS — G47.33 OBSTRUCTIVE SLEEP APNEA (ADULT) (PEDIATRIC): ICD-10-CM

## 2025-02-21 DIAGNOSIS — E89.0 POSTPROCEDURAL HYPOTHYROIDISM: Chronic | ICD-10-CM

## 2025-02-21 DIAGNOSIS — Z01.818 ENCOUNTER FOR OTHER PREPROCEDURAL EXAMINATION: ICD-10-CM

## 2025-02-21 DIAGNOSIS — E66.01 MORBID (SEVERE) OBESITY DUE TO EXCESS CALORIES: ICD-10-CM

## 2025-02-21 PROBLEM — N93.9 ABNORMAL UTERINE AND VAGINAL BLEEDING, UNSPECIFIED: Chronic | Status: INACTIVE | Noted: 2024-01-19 | Resolved: 2025-02-21

## 2025-02-21 PROBLEM — E03.9 HYPOTHYROIDISM, UNSPECIFIED: Chronic | Status: INACTIVE | Noted: 2024-01-19 | Resolved: 2025-02-21

## 2025-02-21 LAB
ALBUMIN SERPL ELPH-MCNC: 3.7 G/DL — SIGNIFICANT CHANGE UP (ref 3.3–5)
ALP SERPL-CCNC: 152 U/L — HIGH (ref 40–120)
ALT FLD-CCNC: 12 U/L — SIGNIFICANT CHANGE UP (ref 10–45)
ANION GAP SERPL CALC-SCNC: 11 MMOL/L — SIGNIFICANT CHANGE UP (ref 5–17)
AST SERPL-CCNC: 14 U/L — SIGNIFICANT CHANGE UP (ref 10–40)
BILIRUB SERPL-MCNC: 0.2 MG/DL — SIGNIFICANT CHANGE UP (ref 0.2–1.2)
BUN SERPL-MCNC: 15 MG/DL — SIGNIFICANT CHANGE UP (ref 7–23)
CALCIUM SERPL-MCNC: 9.1 MG/DL — SIGNIFICANT CHANGE UP (ref 8.4–10.5)
CHLORIDE SERPL-SCNC: 99 MMOL/L — SIGNIFICANT CHANGE UP (ref 96–108)
CO2 SERPL-SCNC: 27 MMOL/L — SIGNIFICANT CHANGE UP (ref 22–31)
CREAT SERPL-MCNC: 0.59 MG/DL — SIGNIFICANT CHANGE UP (ref 0.5–1.3)
EGFR: 108 ML/MIN/1.73M2 — SIGNIFICANT CHANGE UP
GLUCOSE SERPL-MCNC: 118 MG/DL — HIGH (ref 70–99)
HCT VFR BLD CALC: 37.5 % — SIGNIFICANT CHANGE UP (ref 34.5–45)
HGB BLD-MCNC: 11.1 G/DL — LOW (ref 11.5–15.5)
MCHC RBC-ENTMCNC: 18.1 PG — LOW (ref 27–34)
MCHC RBC-ENTMCNC: 29.6 G/DL — LOW (ref 32–36)
MCV RBC AUTO: 61.3 FL — LOW (ref 80–100)
NRBC BLD AUTO-RTO: 0 /100 WBCS — SIGNIFICANT CHANGE UP (ref 0–0)
PLATELET # BLD AUTO: 299 K/UL — SIGNIFICANT CHANGE UP (ref 150–400)
POTASSIUM SERPL-MCNC: 4.1 MMOL/L — SIGNIFICANT CHANGE UP (ref 3.5–5.3)
POTASSIUM SERPL-SCNC: 4.1 MMOL/L — SIGNIFICANT CHANGE UP (ref 3.5–5.3)
PROT SERPL-MCNC: 7.2 G/DL — SIGNIFICANT CHANGE UP (ref 6–8.3)
RBC # BLD: 6.12 M/UL — HIGH (ref 3.8–5.2)
RBC # FLD: 18.1 % — HIGH (ref 10.3–14.5)
SODIUM SERPL-SCNC: 137 MMOL/L — SIGNIFICANT CHANGE UP (ref 135–145)
WBC # BLD: 11.26 K/UL — HIGH (ref 3.8–10.5)
WBC # FLD AUTO: 11.26 K/UL — HIGH (ref 3.8–10.5)

## 2025-02-21 PROCEDURE — 85027 COMPLETE CBC AUTOMATED: CPT

## 2025-02-21 PROCEDURE — 80053 COMPREHEN METABOLIC PANEL: CPT

## 2025-02-21 PROCEDURE — G0463: CPT

## 2025-02-21 NOTE — H&P PST ADULT - NSICDXPASTSURGICALHX_GEN_ALL_CORE_FT
PAST SURGICAL HISTORY:  H/O lymph node excision     H/O sinus surgery     H/O total thyroidectomy     History of breast surgery     History of colonoscopy     History of D&C     History of endoscopy     History of excision of pilonidal cyst     History of partial thyroidectomy     S/P urethral surgery

## 2025-02-21 NOTE — H&P PST ADULT - LAST STRESS TEST
possibly 1 year ago with cardiologist due to c/o palpitations - as per patient, results were normal a possibly 1 year ago with cardiologist due to c/o palpitations - as per patient, results were normal

## 2025-02-21 NOTE — H&P PST ADULT - NSICDXPASTMEDICALHX_GEN_ALL_CORE_FT
PAST MEDICAL HISTORY:  2019 novel coronavirus disease (COVID-19)     Anxiety and depression     Congenital urethral stenosis     Gastroesophageal reflux disease with hiatal hernia     History of IBS     History of palpitations     HLD (hyperlipidemia)     HTN (hypertension)     OAD (obstructive airway disease)     Obesity, morbid, BMI 50 or higher     RODGER on CPAP     Osteoarthritis     Postoperative hypothyroidism

## 2025-02-21 NOTE — H&P PST ADULT - PSY GEN HX ROS MEA POS PC
Tg with labs  Orders:    Thyroglobulin and Antithyroglobulin; Future    TSH with reflex to Free T4 if abnormal; Future     well-managed with lexapro and xanax PRN/depression/anxiety

## 2025-02-21 NOTE — H&P PST ADULT - PROBLEM SELECTOR PLAN 1
Pt is scheduled for a D&C and operative hysteroscopy with Dr. Mendez on 3/14/25  CBC, CMP ordered and obtained at PST

## 2025-02-21 NOTE — H&P PST ADULT - OTHER CARE PROVIDERS
Dr. Darlyn Rene (Cardiologist) [phone] Dr. Darlyn Rene (Cardiologist) [phone]; Dr. Abi Jasso (Pulmonologist)

## 2025-02-21 NOTE — H&P PST ADULT - ASSESSMENT
DASI score: 4.62  DASI activity: uses a cane within home, wheelchair for distances outside of home, able to walk up 1 FOS  Loose teeth or denture: denies DASI score: 4.62  DASI activity: uses a cane within home, wheelchair for distances outside of home, able to walk up 1 FOS  Loose teeth or denture: denies  **POLLOCK when walking a few steps or when climbing 1 FOS  for over a year - pt states cardiologist is aware. Pt denies chest pain, dizziness or syncopal episodes - pending cardiac evaluation prior to upcoming procedure on 3/12/25

## 2025-02-21 NOTE — H&P PST ADULT - CARDIOVASCULAR COMMENTS
pt reports no palpitations since starting nebivolol 1 year ago, sleeps on 2-3 pillows at night pt reports no palpitations since starting nebivolol 1 year ago, sleeps on 2-3 pillows at night, POLLOCK when walking a few steps or when climbing 1 FOS  for over a year - pt states cardiologist is aware. Pt denies chest pain, dizziness or syncopal episodes

## 2025-02-21 NOTE — H&P PST ADULT - NEGATIVE CARDIOVASCULAR SYMPTOMS
no chest pain/no palpitations/no dyspnea on exertion/no peripheral edema no chest pain/no peripheral edema

## 2025-02-21 NOTE — H&P PST ADULT - HISTORY OF PRESENT ILLNESS
54 year old female with PMH morbid obesity (BMI 55.8), palpitations (started on Nebivolol 1 year ago - f/b cards), HTN, HLD, fatty liver (monitored by GI - last ultrasound negative 1 year ago as per pt), RODGER on CPAP, hiatal hernia with GERD, IBS, obstructive airway disease (on Trelegy inhaler - no intubations - uses rescue inhaler once every few months), partial thyroidectomy (2007 for suspicious nodules - benign) and total thyroidectomy (for suspicious nodules - 6/7/2024 - [facility] - benign), postoperative hypothyroidism and uterine polyps s/p D&C/polypectomy (1/2024 - Dr. Mendez) reports c/o intermittent spotting 1/2025. Pt states she had imaging which showed thickening of the uterine lining. Pt now presents to PST for scheduled D&C and operative hysteroscopy with Dr. Mendez on 3/14/25. 54 year old female with PMH morbid obesity (BMI 55.8), palpitations (started on Nebivolol 1 year ago - f/b cards), HTN, HLD, fatty liver (monitored by GI - last ultrasound negative 1 year ago as per pt), RODGER on CPAP, hiatal hernia with GERD, IBS, obstructive airway disease (on Trelegy inhaler - no intubations - uses rescue inhaler once every few months), partial thyroidectomy (2007 for suspicious nodules - benign) and total thyroidectomy (for suspicious nodules - 6/7/2024 - [facility] - benign - voice hoarseness present), postoperative hypothyroidism and uterine polyps s/p D&C/polypectomy (1/2024 - Dr. Mendez) reports c/o intermittent spotting 1/2025. Pt states she had imaging which showed thickening of the uterine lining. Pt now presents to PST for scheduled D&C and operative hysteroscopy with Dr. Mendez on 3/14/25.

## 2025-02-21 NOTE — H&P PST ADULT - LAST ECHOCARDIOGRAM
possibly 1 year ago with cardiologist due to c/o palpitations - as per patient, results were normal and holter monitor was benign

## 2025-02-21 NOTE — H&P PST ADULT - PROBLEM SELECTOR PLAN 2
Due to history of a hiatal hernia, pt advised to follow a FULL liquid diet the day before surgery as per anesthesia team with teach back understanding

## 2025-02-21 NOTE — H&P PST ADULT - ATTENDING COMMENTS
Pt presenting for AUB w/ images concerning for endometrial polyp. Plan for Dilation and Curettage Hysteroscopy w/ polypectomy. r/b/a discussed. All questions answered.    Kit Mendez MD

## 2025-03-14 ENCOUNTER — TRANSCRIPTION ENCOUNTER (OUTPATIENT)
Age: 55
End: 2025-03-14

## 2025-03-14 ENCOUNTER — RESULT REVIEW (OUTPATIENT)
Age: 55
End: 2025-03-14

## 2025-03-14 ENCOUNTER — OUTPATIENT (OUTPATIENT)
Dept: INPATIENT UNIT | Facility: HOSPITAL | Age: 55
LOS: 1 days | End: 2025-03-14
Payer: MEDICARE

## 2025-03-14 VITALS
DIASTOLIC BLOOD PRESSURE: 58 MMHG | OXYGEN SATURATION: 98 % | RESPIRATION RATE: 16 BRPM | HEART RATE: 82 BPM | SYSTOLIC BLOOD PRESSURE: 105 MMHG

## 2025-03-14 VITALS
SYSTOLIC BLOOD PRESSURE: 96 MMHG | DIASTOLIC BLOOD PRESSURE: 57 MMHG | TEMPERATURE: 99 F | RESPIRATION RATE: 16 BRPM | WEIGHT: 293 LBS | HEART RATE: 85 BPM | OXYGEN SATURATION: 96 % | HEIGHT: 62 IN

## 2025-03-14 DIAGNOSIS — E89.0 POSTPROCEDURAL HYPOTHYROIDISM: Chronic | ICD-10-CM

## 2025-03-14 DIAGNOSIS — Z98.890 OTHER SPECIFIED POSTPROCEDURAL STATES: Chronic | ICD-10-CM

## 2025-03-14 DIAGNOSIS — N95.0 POSTMENOPAUSAL BLEEDING: ICD-10-CM

## 2025-03-14 PROCEDURE — 88305 TISSUE EXAM BY PATHOLOGIST: CPT | Mod: 26

## 2025-03-14 PROCEDURE — 88305 TISSUE EXAM BY PATHOLOGIST: CPT

## 2025-03-14 PROCEDURE — C9399: CPT

## 2025-03-14 PROCEDURE — C1782: CPT

## 2025-03-14 PROCEDURE — 58558 HYSTEROSCOPY BIOPSY: CPT

## 2025-03-14 DEVICE — AVETA FLEX RESECTING DEVICE 2.9MM: Type: IMPLANTABLE DEVICE | Status: FUNCTIONAL

## 2025-03-14 RX ORDER — LIDOCAINE HCL/PF 10 MG/ML
0.2 VIAL (ML) INJECTION ONCE
Refills: 0 | Status: DISCONTINUED | OUTPATIENT
Start: 2025-03-14 | End: 2025-03-14

## 2025-03-14 RX ORDER — ESOMEPRAZOLE MAGNESIUM 40 MG/1
1 CAPSULE, DELAYED RELEASE ORAL
Refills: 0 | DISCHARGE

## 2025-03-14 RX ORDER — LEVOTHYROXINE SODIUM 300 MCG
0 TABLET ORAL
Refills: 0 | DISCHARGE

## 2025-03-14 RX ORDER — CEFOTETAN DISODIUM 1 G
2 VIAL (EA) INJECTION ONCE
Refills: 0 | Status: ACTIVE | OUTPATIENT
Start: 2025-03-14 | End: 2025-03-14

## 2025-03-14 RX ORDER — FENTANYL CITRATE-0.9 % NACL/PF 100MCG/2ML
50 SYRINGE (ML) INTRAVENOUS ONCE
Refills: 0 | Status: DISCONTINUED | OUTPATIENT
Start: 2025-03-14 | End: 2025-03-14

## 2025-03-14 RX ORDER — ONDANSETRON HCL/PF 4 MG/2 ML
4 VIAL (ML) INJECTION ONCE
Refills: 0 | Status: DISCONTINUED | OUTPATIENT
Start: 2025-03-14 | End: 2025-03-14

## 2025-03-14 RX ADMIN — Medication 3 MILLILITER(S): at 06:09

## 2025-03-14 NOTE — ASU DISCHARGE PLAN (ADULT/PEDIATRIC) - CALL YOUR DOCTOR IF YOU HAVE ANY OF THE FOLLOWING:
Bleeding that does not stop/Pain not relieved by Medications/Fever greater than (need to indicate Fahrenheit or Celsius) Bleeding that does not stop/Pain not relieved by Medications/Fever greater than (need to indicate Fahrenheit or Celsius)/Unable to urinate

## 2025-03-14 NOTE — ASU DISCHARGE PLAN (ADULT/PEDIATRIC) - FINANCIAL ASSISTANCE
[facility] provides services at a reduced cost to those who are determined to be eligible through [facility]’s financial assistance program. Information regarding [facility]’s financial assistance program can be found by going to [email or web address] or by calling [phone].

## 2025-03-14 NOTE — ASU DISCHARGE PLAN (ADULT/PEDIATRIC) - ASU DC SPECIAL INSTRUCTIONSFT
Postoperative Instructions      Pain control  For pain control, take the followin. Motrin 600mg four times a day, take with food.  2. Add Tylenol 975 four times a day, alternated with motrin.    Motrin and Tylenol can be obtained over the counter.    Postoperative restrictions   Do not drive or make important decisions for 24 hours after anesthesia. Nothing in the vagina (tampons, sexual intercourse), no tub baths, pools or hot tubs for 2 weeks (showers are ok!). No lifting anything heavier than 15 lbs, no strenuous exercise for 2 weeks after surgery.      Vaginal bleeding   Spotting and intermittent passage of blood clots per vagina is normal in first few weeks after surgery.  If you are soaking 1 pad per hour, that is NOT normal and you should notify Dr. Mendez's office and seek medical attention right away.      Signs of Infection  Call the office and/or come to the emergency room for any of the following: foul smelling vaginal discharge, fever over 100.4F, abdominal pain or cramping that does not get better with over the counter medications, nausea/vomiting (especially if you become unable to tolerate oral intake), inability to urinate. Any of these could be signs that you may be developing an infection requiring antibiotics.    Follow Up  Call Dr. Mendez's office to schedule a postoperative appointment in 2 weeks.

## 2025-03-14 NOTE — ASU DISCHARGE PLAN (ADULT/PEDIATRIC) - CARE PROVIDER_API CALL
Kit Mendez)  Obstetrics and Gynecology  [address]  [place] [address]  Phone: [phone]  Fax: [phone]  Follow Up Time: 2 weeks

## 2025-03-14 NOTE — ASU PATIENT PROFILE, ADULT - FALL HARM RISK - HARM RISK INTERVENTIONS
Assistance with ambulation/Assistance OOB with selected safe patient handling equipment/Communicate Risk of Fall with Harm to all staff/Discuss with provider need for PT consult/Monitor gait and stability/Move patient closer to nurses' station/Provide patient with walking aids - walker, cane, crutches/Reinforce activity limits and safety measures with patient and family/Tailored Fall Risk Interventions/Visual Cue: Yellow wristband and red socks/Bed in lowest position, wheels locked, appropriate side rails in place/Call bell, personal items and telephone in reach/Instruct patient to call for assistance before getting out of bed or chair/Non-slip footwear when patient is out of bed/[place] to call system/Physically safe environment - no spills, clutter or unnecessary equipment/Purposeful Proactive Rounding/Room/bathroom lighting operational, light cord in reach

## 2025-03-14 NOTE — ASU DISCHARGE PLAN (ADULT/PEDIATRIC) - NURSING INSTRUCTIONS
Next dose of Tylenol will be on or after __2 pm _________ ,today/tonight and every 6 hours afterwards for pain management, do not take any Tylenol containing products until this time. Your first dose of Tylenol was given at ___________. Do not exceed more than 4000mg of Tylenol in one 24 hour setting. If no contraindications, you may alternate with Ibuprofen 3 hours after dose of Tylenol. Ibuprofen can be taken every 6 hours. ___next dose @ 2:30 pm today

## 2025-03-14 NOTE — BRIEF OPERATIVE NOTE - OPERATION/FINDINGS
EUA revealed anteverted, 8 week sized uterus. No adnexal masses palpable bilaterally. Cervix and vagina appeared unremarkable.     Hysteroscopy showed normal cervical tissue, fluffy endometrial tissue with white miliary lesions scattered across endometrium. No distinct polyp or mass visualized. Bilateral ostia visualized and appear normal.    Fluid deficit: 170cc

## 2025-03-14 NOTE — PRE-ANESTHESIA EVALUATION ADULT - NSANTHPMHFT_GEN_ALL_CORE
normal echo and stress in chart. follows outpt cardiologist and pulmonologist for POLLOCK. No further w/u indicated per outpt physicians. POLLOCK 2/2 deconditioning and BMI

## 2025-03-21 LAB — SURGICAL PATHOLOGY STUDY: SIGNIFICANT CHANGE UP

## 2025-03-25 ENCOUNTER — LABORATORY RESULT (OUTPATIENT)
Age: 55
End: 2025-03-25

## 2025-03-25 ENCOUNTER — NON-APPOINTMENT (OUTPATIENT)
Age: 55
End: 2025-03-25

## 2025-03-25 ENCOUNTER — APPOINTMENT (OUTPATIENT)
Dept: RHEUMATOLOGY | Facility: CLINIC | Age: 55
End: 2025-03-25
Payer: MEDICARE

## 2025-03-25 VITALS
OXYGEN SATURATION: 96 % | BODY MASS INDEX: 51.91 KG/M2 | HEART RATE: 98 BPM | TEMPERATURE: 98 F | DIASTOLIC BLOOD PRESSURE: 72 MMHG | HEIGHT: 63 IN | RESPIRATION RATE: 18 BRPM | WEIGHT: 293 LBS | SYSTOLIC BLOOD PRESSURE: 117 MMHG

## 2025-03-25 DIAGNOSIS — D56.3 THALASSEMIA MINOR: ICD-10-CM

## 2025-03-25 DIAGNOSIS — E78.5 HYPERLIPIDEMIA, UNSPECIFIED: ICD-10-CM

## 2025-03-25 DIAGNOSIS — M19.90 UNSPECIFIED OSTEOARTHRITIS, UNSPECIFIED SITE: ICD-10-CM

## 2025-03-25 DIAGNOSIS — K21.9 GASTRO-ESOPHAGEAL REFLUX DISEASE W/OUT ESOPHAGITIS: ICD-10-CM

## 2025-03-25 DIAGNOSIS — M25.50 PAIN IN UNSPECIFIED JOINT: ICD-10-CM

## 2025-03-25 DIAGNOSIS — Z79.899 OTHER LONG TERM (CURRENT) DRUG THERAPY: ICD-10-CM

## 2025-03-25 PROCEDURE — 99204 OFFICE O/P NEW MOD 45 MIN: CPT

## 2025-03-25 PROCEDURE — G2211 COMPLEX E/M VISIT ADD ON: CPT

## 2025-03-25 RX ORDER — MONTELUKAST SODIUM 4 MG/1
4 GRANULE ORAL
Refills: 0 | Status: ACTIVE | COMMUNITY
Start: 2025-03-25

## 2025-03-26 LAB
ALBUMIN SERPL ELPH-MCNC: 4.1 G/DL
ALP BLD-CCNC: 157 U/L
ALT SERPL-CCNC: 15 U/L
ANION GAP SERPL CALC-SCNC: 18 MMOL/L
APPEARANCE: CLEAR
AST SERPL-CCNC: 16 U/L
BACTERIA: ABNORMAL /HPF
BASOPHILS # BLD AUTO: 0.06 K/UL
BASOPHILS NFR BLD AUTO: 0.5 %
BILIRUB SERPL-MCNC: 0.2 MG/DL
BILIRUBIN URINE: NEGATIVE
BLOOD URINE: ABNORMAL
BUN SERPL-MCNC: 15 MG/DL
C3 SERPL-MCNC: 195 MG/DL
C4 SERPL-MCNC: 36 MG/DL
CALCIUM SERPL-MCNC: 9.6 MG/DL
CAST: NORMAL /LPF
CCP AB SER IA-ACNC: <8 U/ML
CHLORIDE SERPL-SCNC: 101 MMOL/L
CO2 SERPL-SCNC: 23 MMOL/L
COLOR: YELLOW
CREAT SERPL-MCNC: 0.72 MG/DL
CREAT SPEC-SCNC: 182 MG/DL
CREAT/PROT UR: 0.1 RATIO
CRP SERPL-MCNC: 26 MG/L
DSDNA AB SER-ACNC: <1 IU/ML
EGFRCR SERPLBLD CKD-EPI 2021: 101 ML/MIN/1.73M2
ENA RNP AB SER IA-ACNC: <0.2 AL
ENA SM AB SER IA-ACNC: <0.2 AL
ENA SS-A AB SER IA-ACNC: <0.2 AL
ENA SS-B AB SER IA-ACNC: <0.2 AL
EOSINOPHIL # BLD AUTO: 0.2 K/UL
EOSINOPHIL NFR BLD AUTO: 1.6 %
EPITHELIAL CELLS: 12 /HPF
ERYTHROCYTE [SEDIMENTATION RATE] IN BLOOD BY WESTERGREN METHOD: 120 MM/HR
GLUCOSE QUALITATIVE U: NEGATIVE MG/DL
GLUCOSE SERPL-MCNC: 90 MG/DL
HBV CORE IGG+IGM SER QL: NONREACTIVE
HBV SURFACE AB SER QL: REACTIVE
HBV SURFACE AG SER QL: NONREACTIVE
HCT VFR BLD CALC: 38.5 %
HCV AB SER QL: NONREACTIVE
HCV S/CO RATIO: 0.09 S/CO
HGB BLD-MCNC: 11.4 G/DL
IMM GRANULOCYTES NFR BLD AUTO: 0.2 %
KETONES URINE: ABNORMAL MG/DL
LEUKOCYTE ESTERASE URINE: ABNORMAL
LYMPHOCYTES # BLD AUTO: 3.77 K/UL
LYMPHOCYTES NFR BLD AUTO: 30.4 %
MAN DIFF?: NORMAL
MCHC RBC-ENTMCNC: 18.3 PG
MCHC RBC-ENTMCNC: 29.6 G/DL
MCV RBC AUTO: 61.9 FL
MICROSCOPIC-UA: NORMAL
MONOCYTES # BLD AUTO: 0.87 K/UL
MONOCYTES NFR BLD AUTO: 7 %
NEUTROPHILS # BLD AUTO: 7.48 K/UL
NEUTROPHILS NFR BLD AUTO: 60.3 %
NITRITE URINE: NEGATIVE
PH URINE: 5.5
PLATELET # BLD AUTO: 309 K/UL
POTASSIUM SERPL-SCNC: 4.4 MMOL/L
PROT SERPL-MCNC: 7 G/DL
PROT UR-MCNC: 22 MG/DL
PROTEIN URINE: NORMAL MG/DL
RBC # BLD: 6.22 M/UL
RBC # FLD: 19.9 %
RED BLOOD CELLS URINE: 2 /HPF
REVIEW: NORMAL
RF+CCP IGG SER-IMP: NEGATIVE
RHEUMATOID FACT SER QL: 10 IU/ML
SODIUM SERPL-SCNC: 142 MMOL/L
SPECIFIC GRAVITY URINE: 1.03
UROBILINOGEN URINE: 0.2 MG/DL
WBC # FLD AUTO: 12.41 K/UL
WHITE BLOOD CELLS URINE: 4 /HPF

## 2025-03-27 LAB — ANA SER IF-ACNC: NEGATIVE

## 2025-03-28 LAB
HLA-B27 QL NAA+PROBE: NORMAL
M TB IFN-G BLD-IMP: NEGATIVE
QUANTIFERON TB PLUS MITOGEN MINUS NIL: >10 IU/ML
QUANTIFERON TB PLUS NIL: 0.06 IU/ML
QUANTIFERON TB PLUS TB1 MINUS NIL: 0 IU/ML
QUANTIFERON TB PLUS TB2 MINUS NIL: 0 IU/ML

## 2025-03-29 LAB — G6PD SER-CCNC: 23.4 U/G HGB

## 2025-04-07 PROBLEM — M06.00 SERONEGATIVE RHEUMATOID ARTHRITIS: Status: ACTIVE | Noted: 2025-04-07

## 2025-05-28 RX ORDER — FLUTICASONE FUROATE, UMECLIDINIUM BROMIDE AND VILANTEROL TRIFENATATE 200; 62.5; 25 UG/1; UG/1; UG/1
200-62.5-25 POWDER RESPIRATORY (INHALATION)
Qty: 1 | Refills: 3 | Status: ACTIVE | COMMUNITY
Start: 2025-05-28 | End: 1900-01-01

## 2025-05-28 RX ORDER — MONTELUKAST 10 MG/1
10 TABLET, FILM COATED ORAL
Qty: 90 | Refills: 1 | Status: ACTIVE | COMMUNITY
Start: 2025-05-28 | End: 1900-01-01

## 2025-07-05 ENCOUNTER — NON-APPOINTMENT (OUTPATIENT)
Age: 55
End: 2025-07-05

## 2025-07-07 ENCOUNTER — APPOINTMENT (OUTPATIENT)
Dept: PULMONOLOGY | Facility: CLINIC | Age: 55
End: 2025-07-07

## 2025-08-07 RX ORDER — METHYLPREDNISOLONE 4 MG/1
4 TABLET ORAL
Qty: 1 | Refills: 3 | Status: ACTIVE | COMMUNITY
Start: 2025-08-07 | End: 1900-01-01

## 2025-08-18 ENCOUNTER — APPOINTMENT (OUTPATIENT)
Dept: RHEUMATOLOGY | Facility: CLINIC | Age: 55
End: 2025-08-18
Payer: MEDICARE

## 2025-08-18 VITALS
RESPIRATION RATE: 18 BRPM | WEIGHT: 293 LBS | HEART RATE: 98 BPM | BODY MASS INDEX: 51.91 KG/M2 | HEIGHT: 63 IN | SYSTOLIC BLOOD PRESSURE: 125 MMHG | TEMPERATURE: 97 F | OXYGEN SATURATION: 98 % | DIASTOLIC BLOOD PRESSURE: 77 MMHG

## 2025-08-18 DIAGNOSIS — Z79.899 OTHER LONG TERM (CURRENT) DRUG THERAPY: ICD-10-CM

## 2025-08-18 DIAGNOSIS — M06.00 RHEUMATOID ARTHRITIS W/OUT RHEUMATOID FACTOR, UNSPECIFIED SITE: ICD-10-CM

## 2025-08-18 PROCEDURE — 99214 OFFICE O/P EST MOD 30 MIN: CPT

## 2025-08-18 PROCEDURE — G2211 COMPLEX E/M VISIT ADD ON: CPT

## 2025-09-03 ENCOUNTER — NON-APPOINTMENT (OUTPATIENT)
Age: 55
End: 2025-09-03

## 2025-09-05 ENCOUNTER — APPOINTMENT (OUTPATIENT)
Dept: PULMONOLOGY | Facility: CLINIC | Age: 55
End: 2025-09-05

## 2025-09-10 ENCOUNTER — APPOINTMENT (OUTPATIENT)
Dept: PULMONOLOGY | Facility: CLINIC | Age: 55
End: 2025-09-10
Payer: MEDICARE

## 2025-09-10 VITALS
WEIGHT: 290 LBS | HEIGHT: 63 IN | SYSTOLIC BLOOD PRESSURE: 102 MMHG | OXYGEN SATURATION: 96 % | TEMPERATURE: 98.2 F | HEART RATE: 78 BPM | RESPIRATION RATE: 16 BRPM | DIASTOLIC BLOOD PRESSURE: 65 MMHG | BODY MASS INDEX: 51.38 KG/M2

## 2025-09-10 PROCEDURE — 99214 OFFICE O/P EST MOD 30 MIN: CPT

## 2025-09-10 RX ORDER — SEMAGLUTIDE 1.34 MG/ML
2 INJECTION, SOLUTION SUBCUTANEOUS
Refills: 0 | Status: ACTIVE | COMMUNITY

## 2025-09-10 RX ORDER — BUSPIRONE HYDROCHLORIDE 10 MG/1
10 TABLET ORAL
Refills: 0 | Status: ACTIVE | COMMUNITY

## (undated) DEVICE — PACK LITHOTOMY

## (undated) DEVICE — POSITIONER PATIENT SAFETY STRAP 3X60"

## (undated) DEVICE — DRAPE LIGHT HANDLE COVER (GREEN)

## (undated) DEVICE — GLV 7 PROTEXIS (WHITE)

## (undated) DEVICE — AVETA CORAL HYSTEROSCOPE 4.6MM DISP

## (undated) DEVICE — SOL IRR GLYCINE 1.5% 3000L

## (undated) DEVICE — SOL IRR BAG NS 0.9% 3000ML

## (undated) DEVICE — DRAPE 1/2 SHEET 40X57"

## (undated) DEVICE — ACCESSORY AVETA WASTE MANAGEMENT 3MM

## (undated) DEVICE — TUBING STRYKER HYSTEROSCOPY INFLOW OUTFLOW

## (undated) DEVICE — SOL IRR POUR NS 0.9% 500ML

## (undated) DEVICE — AVETA FLUID MANAGEMENT ACCESSORY

## (undated) DEVICE — WARMING BLANKET UPPER ADULT

## (undated) DEVICE — Device

## (undated) DEVICE — AVETA FLUID MANAGEMENT ACCESSORY W CAP